# Patient Record
Sex: FEMALE | Race: WHITE | NOT HISPANIC OR LATINO | Employment: UNEMPLOYED | URBAN - METROPOLITAN AREA
[De-identification: names, ages, dates, MRNs, and addresses within clinical notes are randomized per-mention and may not be internally consistent; named-entity substitution may affect disease eponyms.]

---

## 2019-11-21 ENCOUNTER — HOSPITAL ENCOUNTER (EMERGENCY)
Facility: HOSPITAL | Age: 29
Discharge: HOME | End: 2019-11-21
Attending: EMERGENCY MEDICINE
Payer: COMMERCIAL

## 2019-11-21 VITALS
SYSTOLIC BLOOD PRESSURE: 115 MMHG | HEIGHT: 65 IN | DIASTOLIC BLOOD PRESSURE: 70 MMHG | TEMPERATURE: 98 F | HEART RATE: 80 BPM | RESPIRATION RATE: 18 BRPM | WEIGHT: 152 LBS | BODY MASS INDEX: 25.33 KG/M2 | OXYGEN SATURATION: 100 %

## 2019-11-21 DIAGNOSIS — M25.561 RIGHT KNEE PAIN, UNSPECIFIED CHRONICITY: Primary | ICD-10-CM

## 2019-11-21 DIAGNOSIS — M54.9 BACK PAIN, UNSPECIFIED BACK LOCATION, UNSPECIFIED BACK PAIN LATERALITY, UNSPECIFIED CHRONICITY: ICD-10-CM

## 2019-11-21 DIAGNOSIS — R52 PAIN: ICD-10-CM

## 2019-11-21 LAB
B-HCG UR QL: NEGATIVE
CTP QC/QA: YES

## 2019-11-21 PROCEDURE — 96372 THER/PROPH/DIAG INJ SC/IM: CPT

## 2019-11-21 PROCEDURE — 99284 EMERGENCY DEPT VISIT MOD MDM: CPT | Mod: 25

## 2019-11-21 PROCEDURE — 81025 URINE PREGNANCY TEST: CPT | Performed by: EMERGENCY MEDICINE

## 2019-11-21 PROCEDURE — 63600175 PHARM REV CODE 636 W HCPCS: Performed by: EMERGENCY MEDICINE

## 2019-11-21 RX ORDER — ACETAMINOPHEN 500 MG
1000 TABLET ORAL
Status: DISCONTINUED | OUTPATIENT
Start: 2019-11-21 | End: 2019-11-22 | Stop reason: HOSPADM

## 2019-11-21 RX ORDER — METHOCARBAMOL 500 MG/1
500 TABLET, FILM COATED ORAL 4 TIMES DAILY
Qty: 40 TABLET | Refills: 0 | Status: SHIPPED | OUTPATIENT
Start: 2019-11-21 | End: 2019-12-01

## 2019-11-21 RX ORDER — KETOROLAC TROMETHAMINE 30 MG/ML
15 INJECTION, SOLUTION INTRAMUSCULAR; INTRAVENOUS
Status: COMPLETED | OUTPATIENT
Start: 2019-11-21 | End: 2019-11-21

## 2019-11-21 RX ADMIN — KETOROLAC TROMETHAMINE 15 MG: 30 INJECTION, SOLUTION INTRAMUSCULAR at 11:11
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
## 2020-01-09 ENCOUNTER — HOSPITAL ENCOUNTER (EMERGENCY)
Facility: HOSPITAL | Age: 30
Discharge: HOME | End: 2020-01-09
Attending: EMERGENCY MEDICINE
Payer: MEDICAID

## 2020-01-09 VITALS
HEART RATE: 78 BPM | BODY MASS INDEX: 24.16 KG/M2 | DIASTOLIC BLOOD PRESSURE: 64 MMHG | TEMPERATURE: 98 F | SYSTOLIC BLOOD PRESSURE: 98 MMHG | HEIGHT: 65 IN | OXYGEN SATURATION: 100 % | WEIGHT: 145 LBS | RESPIRATION RATE: 16 BRPM

## 2020-01-09 DIAGNOSIS — S09.90XA CLOSED HEAD INJURY, INITIAL ENCOUNTER: ICD-10-CM

## 2020-01-09 DIAGNOSIS — N30.00 ACUTE CYSTITIS WITHOUT HEMATURIA: ICD-10-CM
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
## 2020-01-09 DIAGNOSIS — S61.209A AVULSION OF SKIN OF FINGER, INITIAL ENCOUNTER: ICD-10-CM

## 2020-01-09 LAB
B-HCG UR QL: NEGATIVE
BACTERIA #/AREA URNS HPF: ABNORMAL /HPF
BILIRUB UR QL STRIP: NEGATIVE
CLARITY UR: CLEAR
COLOR UR: ABNORMAL
CTP QC/QA: YES
GLUCOSE UR QL STRIP: NEGATIVE
HGB UR QL STRIP: NEGATIVE
KETONES UR QL STRIP: ABNORMAL
LEUKOCYTE ESTERASE UR QL STRIP: ABNORMAL
MICROSCOPIC COMMENT: ABNORMAL
NITRITE UR QL STRIP: NEGATIVE
PH UR STRIP: 7 [PH] (ref 5–8)
PROT UR QL STRIP: NEGATIVE
RBC #/AREA URNS HPF: 5 /HPF (ref 0–4)
SP GR UR STRIP: 1.02 (ref 1–1.03)
SQUAMOUS #/AREA URNS HPF: 27 /HPF
URN SPEC COLLECT METH UR: ABNORMAL
UROBILINOGEN UR STRIP-ACNC: ABNORMAL EU/DL
WBC #/AREA URNS HPF: 15 /HPF (ref 0–5)

## 2020-01-09 PROCEDURE — 99284 EMERGENCY DEPT VISIT MOD MDM: CPT | Mod: 25

## 2020-01-09 PROCEDURE — 87077 CULTURE AEROBIC IDENTIFY: CPT

## 2020-01-09 PROCEDURE — 25000003 PHARM REV CODE 250: Performed by: EMERGENCY MEDICINE

## 2020-01-09 PROCEDURE — 81025 URINE PREGNANCY TEST: CPT | Performed by: EMERGENCY MEDICINE

## 2020-01-09 PROCEDURE — 87186 SC STD MICRODIL/AGAR DIL: CPT

## 2020-01-09 PROCEDURE — 87088 URINE BACTERIA CULTURE: CPT

## 2020-01-09 PROCEDURE — 81000 URINALYSIS NONAUTO W/SCOPE: CPT

## 2020-01-09 PROCEDURE — 87086 URINE CULTURE/COLONY COUNT: CPT

## 2020-01-09 RX ORDER — KETOROLAC TROMETHAMINE 30 MG/ML
30 INJECTION, SOLUTION INTRAMUSCULAR; INTRAVENOUS
Status: DISCONTINUED | OUTPATIENT
Start: 2020-01-09 | End: 2020-01-09

## 2020-01-09 RX ORDER — CEPHALEXIN 500 MG/1
500 CAPSULE ORAL EVERY 8 HOURS
Qty: 21 CAPSULE | Refills: 0 | Status: SHIPPED | OUTPATIENT
Start: 2020-01-09 | End: 2020-01-16

## 2020-01-09 RX ORDER — ACETAMINOPHEN 325 MG/1
650 TABLET ORAL
Status: DISCONTINUED | OUTPATIENT
Start: 2020-01-09 | End: 2020-01-09 | Stop reason: HOSPADM

## 2020-01-09 RX ORDER — HYDROCODONE BITARTRATE AND ACETAMINOPHEN 7.5; 325 MG/1; MG/1
1 TABLET ORAL
Status: DISCONTINUED | OUTPATIENT
Start: 2020-01-09 | End: 2020-01-09 | Stop reason: HOSPADM

## 2020-01-09 RX ORDER — BUTALBITAL, ACETAMINOPHEN AND CAFFEINE 50; 325; 40 MG/1; MG/1; MG/1
1 TABLET ORAL EVERY 6 HOURS PRN
Qty: 14 TABLET | Refills: 0 | Status: SHIPPED | OUTPATIENT
Start: 2020-01-09 | End: 2020-02-08

## 2020-01-09 RX ORDER — BACITRACIN ZINC 500 UNIT/G
OINTMENT (GRAM) TOPICAL 2 TIMES DAILY
Qty: 28 G | Refills: 0 | Status: SHIPPED | OUTPATIENT
Start: 2020-01-09 | End: 2020-05-02 | Stop reason: ALTCHOICE

## 2020-01-09 RX ORDER — METOCLOPRAMIDE 10 MG/1
10 TABLET ORAL EVERY 6 HOURS PRN
Qty: 14 TABLET | Refills: 0 | Status: SHIPPED | OUTPATIENT
Start: 2020-01-09 | End: 2020-05-02 | Stop reason: ALTCHOICE

## 2020-01-09 RX ORDER — METOCLOPRAMIDE HYDROCHLORIDE 5 MG/ML
10 INJECTION INTRAMUSCULAR; INTRAVENOUS
Status: DISCONTINUED | OUTPATIENT
Start: 2020-01-09 | End: 2020-01-09 | Stop reason: HOSPADM

## 2020-01-09 RX ADMIN — BACITRACIN ZINC, POLYMYXIN B SULFATE, NEOMYCIN SULFATE 1 EACH: 400; 5000; 3.5 OINTMENT TOPICAL at 02:01
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
## 2020-01-09 NOTE — ED TRIAGE NOTES
Pt. Care assumed. Pt. Is awake, alert and oriented. Pt. Is tearful and requesting pain medication. Ordered medications reviewed with pt., but refuses at this time.

## 2020-01-09 NOTE — ED NOTES
Pt. Refuses Tylenol and Metoclopramide. Pt. Does not have a ride home and requests pain medication. Pt. Upset and cursing.

## 2020-01-11 LAB — BACTERIA UR CULT: ABNORMAL

## 2020-02-21 ENCOUNTER — HOSPITAL ENCOUNTER (EMERGENCY)
Facility: HOSPITAL | Age: 30
Discharge: HOME | End: 2020-02-21
Attending: EMERGENCY MEDICINE
Payer: MEDICAID

## 2020-02-21 VITALS
DIASTOLIC BLOOD PRESSURE: 74 MMHG | SYSTOLIC BLOOD PRESSURE: 122 MMHG | BODY MASS INDEX: 24.66 KG/M2 | TEMPERATURE: 98 F | WEIGHT: 148 LBS | RESPIRATION RATE: 18 BRPM | HEART RATE: 73 BPM | OXYGEN SATURATION: 99 % | HEIGHT: 65 IN

## 2020-02-21 DIAGNOSIS — N30.01 ACUTE CYSTITIS WITH HEMATURIA: Primary | ICD-10-CM

## 2020-02-21 DIAGNOSIS — M54.50 ACUTE BILATERAL LOW BACK PAIN WITHOUT SCIATICA: ICD-10-CM

## 2020-02-21 LAB
ALBUMIN SERPL BCP-MCNC: 4.3 G/DL (ref 3.5–5.2)
ALP SERPL-CCNC: 76 U/L (ref 38–126)
ALT SERPL W/O P-5'-P-CCNC: 37 U/L (ref 10–44)
ANION GAP SERPL CALC-SCNC: 7 MMOL/L (ref 8–16)
AST SERPL-CCNC: 34 U/L (ref 15–46)
B-HCG UR QL: NEGATIVE
BACTERIA #/AREA URNS AUTO: NORMAL /HPF
BASOPHILS # BLD AUTO: 0.05 K/UL (ref 0–0.2)
BASOPHILS NFR BLD: 0.7 % (ref 0–1.9)
BILIRUB SERPL-MCNC: 0.2 MG/DL (ref 0.1–1)
BILIRUB UR QL STRIP: NEGATIVE
BUN SERPL-MCNC: 15 MG/DL (ref 7–17)
CALCIUM SERPL-MCNC: 8.7 MG/DL (ref 8.7–10.5)
CHLORIDE SERPL-SCNC: 103 MMOL/L (ref 95–110)
CLARITY UR REFRACT.AUTO: CLEAR
CO2 SERPL-SCNC: 29 MMOL/L (ref 23–29)
COLOR UR AUTO: YELLOW
CREAT SERPL-MCNC: 0.76 MG/DL (ref 0.5–1.4)
DIFFERENTIAL METHOD: ABNORMAL
EOSINOPHIL # BLD AUTO: 0.2 K/UL (ref 0–0.5)
EOSINOPHIL NFR BLD: 3 % (ref 0–8)
ERYTHROCYTE [DISTWIDTH] IN BLOOD BY AUTOMATED COUNT: 12.9 % (ref 11.5–14.5)
EST. GFR  (AFRICAN AMERICAN): >60 ML/MIN/1.73 M^2
EST. GFR  (NON AFRICAN AMERICAN): >60 ML/MIN/1.73 M^2
GLUCOSE SERPL-MCNC: 69 MG/DL (ref 70–110)
GLUCOSE UR QL STRIP: NEGATIVE
HCT VFR BLD AUTO: 41 % (ref 37–48.5)
HGB BLD-MCNC: 13 G/DL (ref 12–16)
HGB UR QL STRIP: ABNORMAL
IMM GRANULOCYTES # BLD AUTO: 0.01 K/UL (ref 0–0.04)
IMM GRANULOCYTES NFR BLD AUTO: 0.1 % (ref 0–0.5)
KETONES UR QL STRIP: ABNORMAL
LEUKOCYTE ESTERASE UR QL STRIP: ABNORMAL
LIPASE SERPL-CCNC: 114 U/L (ref 23–300)
LYMPHOCYTES # BLD AUTO: 2.3 K/UL (ref 1–4.8)
LYMPHOCYTES NFR BLD: 30.9 % (ref 18–48)
MCH RBC QN AUTO: 32.3 PG (ref 27–31)
MCHC RBC AUTO-ENTMCNC: 31.7 G/DL (ref 32–36)
MCV RBC AUTO: 102 FL (ref 82–98)
MICROSCOPIC COMMENT: NORMAL
MONOCYTES # BLD AUTO: 0.7 K/UL (ref 0.3–1)
MONOCYTES NFR BLD: 9.1 % (ref 4–15)
NEUTROPHILS # BLD AUTO: 4.3 K/UL (ref 1.8–7.7)
NEUTROPHILS NFR BLD: 56.2 % (ref 38–73)
NITRITE UR QL STRIP: NEGATIVE
NRBC BLD-RTO: 0 /100 WBC
PH UR STRIP: 5 [PH] (ref 5–8)
PLATELET # BLD AUTO: 323 K/UL (ref 150–350)
PMV BLD AUTO: 9.4 FL (ref 9.2–12.9)
POTASSIUM SERPL-SCNC: 3.8 MMOL/L (ref 3.5–5.1)
PROT SERPL-MCNC: 7.3 G/DL (ref 6–8.4)
PROT UR QL STRIP: ABNORMAL
RBC # BLD AUTO: 4.02 M/UL (ref 4–5.4)
RBC #/AREA URNS AUTO: 1 /HPF (ref 0–4)
SODIUM SERPL-SCNC: 139 MMOL/L (ref 136–145)
SP GR UR STRIP: 1.02 (ref 1–1.03)
URN SPEC COLLECT METH UR: ABNORMAL
UROBILINOGEN UR STRIP-ACNC: NEGATIVE EU/DL
WBC # BLD AUTO: 7.58 K/UL (ref 3.9–12.7)
WBC #/AREA URNS AUTO: 2 /HPF (ref 0–5)

## 2020-02-21 PROCEDURE — 83690 ASSAY OF LIPASE: CPT | Mod: ER

## 2020-02-21 PROCEDURE — 99284 EMERGENCY DEPT VISIT MOD MDM: CPT | Mod: 25,ER

## 2020-02-21 PROCEDURE — 25000003 PHARM REV CODE 250: Mod: ER | Performed by: PHYSICIAN ASSISTANT

## 2020-02-21 PROCEDURE — 81000 URINALYSIS NONAUTO W/SCOPE: CPT | Mod: ER

## 2020-02-21 PROCEDURE — 96374 THER/PROPH/DIAG INJ IV PUSH: CPT | Mod: ER

## 2020-02-21 PROCEDURE — 96361 HYDRATE IV INFUSION ADD-ON: CPT | Mod: ER

## 2020-02-21 PROCEDURE — 63600175 PHARM REV CODE 636 W HCPCS: Mod: ER | Performed by: PHYSICIAN ASSISTANT

## 2020-02-21 PROCEDURE — 85025 COMPLETE CBC W/AUTO DIFF WBC: CPT | Mod: ER

## 2020-02-21 PROCEDURE — 80053 COMPREHEN METABOLIC PANEL: CPT | Mod: ER

## 2020-02-21 PROCEDURE — 81025 URINE PREGNANCY TEST: CPT | Mod: ER

## 2020-02-21 PROCEDURE — 96375 TX/PRO/DX INJ NEW DRUG ADDON: CPT | Mod: ER

## 2020-02-21 RX ORDER — SULFAMETHOXAZOLE AND TRIMETHOPRIM 800; 160 MG/1; MG/1
1 TABLET ORAL
Status: COMPLETED | OUTPATIENT
Start: 2020-02-21 | End: 2020-02-21

## 2020-02-21 RX ORDER — DEXTROMETHORPHAN HYDROBROMIDE, GUAIFENESIN 5; 100 MG/5ML; MG/5ML
650 LIQUID ORAL EVERY 8 HOURS
Qty: 12 TABLET | Refills: 0 | Status: SHIPPED | OUTPATIENT
Start: 2020-02-21 | End: 2020-06-22

## 2020-02-21 RX ORDER — METHOCARBAMOL 500 MG/1
500 TABLET, FILM COATED ORAL 3 TIMES DAILY
Qty: 15 TABLET | Refills: 0 | Status: SHIPPED | OUTPATIENT
Start: 2020-02-21 | End: 2020-02-26

## 2020-02-21 RX ORDER — GABAPENTIN 600 MG/1
600 TABLET ORAL 3 TIMES DAILY
COMMUNITY
End: 2020-04-29 | Stop reason: CLARIF

## 2020-02-21 RX ORDER — SULFAMETHOXAZOLE AND TRIMETHOPRIM 800; 160 MG/1; MG/1
1 TABLET ORAL 2 TIMES DAILY
Qty: 14 TABLET | Refills: 0 | Status: SHIPPED | OUTPATIENT
Start: 2020-02-21 | End: 2020-02-28

## 2020-02-21 RX ORDER — HALOPERIDOL 5 MG/ML
2.5 INJECTION INTRAMUSCULAR
Status: COMPLETED | OUTPATIENT
Start: 2020-02-21 | End: 2020-02-21

## 2020-02-21 RX ORDER — MORPHINE SULFATE 4 MG/ML
2 INJECTION, SOLUTION INTRAMUSCULAR; INTRAVENOUS
Status: COMPLETED | OUTPATIENT
Start: 2020-02-21 | End: 2020-02-21

## 2020-02-21 RX ORDER — TRAMADOL HYDROCHLORIDE 50 MG/1
50 TABLET ORAL
Status: COMPLETED | OUTPATIENT
Start: 2020-02-21 | End: 2020-02-21

## 2020-02-21 RX ORDER — CLONAZEPAM 1 MG/1
1 TABLET ORAL 2 TIMES DAILY PRN
COMMUNITY
End: 2020-04-29 | Stop reason: CLARIF

## 2020-02-21 RX ORDER — ONDANSETRON 2 MG/ML
4 INJECTION INTRAMUSCULAR; INTRAVENOUS
Status: COMPLETED | OUTPATIENT
Start: 2020-02-21 | End: 2020-02-21

## 2020-02-21 RX ADMIN — SODIUM CHLORIDE 1000 ML: 0.9 INJECTION, SOLUTION INTRAVENOUS at 02:02

## 2020-02-21 RX ADMIN — TRAMADOL HYDROCHLORIDE 50 MG: 50 TABLET, FILM COATED ORAL at 02:02

## 2020-02-21 RX ADMIN — ONDANSETRON 4 MG: 2 INJECTION INTRAMUSCULAR; INTRAVENOUS at 04:02

## 2020-02-21 RX ADMIN — MORPHINE SULFATE 2 MG: 4 INJECTION INTRAVENOUS at 03:02

## 2020-02-21 RX ADMIN — HALOPERIDOL LACTATE 2.5 MG: 5 INJECTION, SOLUTION INTRAMUSCULAR at 04:02

## 2020-02-21 RX ADMIN — SULFAMETHOXAZOLE AND TRIMETHOPRIM 1 TABLET: 800; 160 TABLET ORAL at 04:02

## 2020-02-21 NOTE — ED PROVIDER NOTES
"Encounter Date: 2/21/2020       History     Chief Complaint   Patient presents with    Back Pain     Pt reports pain to bilateral sides of lower back "for a while", states today is progressively worse.  Pt states has cloudy foul smelling urine.  Pt denies vaginal d/c, denies burning with urination. Pt states took gabapentin 4 hours ago for pain with no relief.      29-year-old female presents to the emergency department for evaluation of one-week history of dysuria and bilateral low back pain. She reports that the symptoms began gradually 1 week ago and have been constant since onset.  She has been attempting treatment at home with Tylenol with only mild relief of symptoms. She does report that she has had increased frequency of urination.  She states that the last time she felt like this she had a kidney infection.  She denies any abdominal pain, vomiting, diarrhea, constipation, vaginal bleeding, vaginal discharge or pelvic pain. She denies any other associated symptoms including fever, headache, dizziness, chest pain, shortness of breath or coughing.        Review of patient's allergies indicates:   Allergen Reactions    Naproxen Hives    Tessalon [benzonatate] Swelling     Past Medical History:   Diagnosis Date    Chronic bronchitis     Depression     Hepatitis C      Past Surgical History:   Procedure Laterality Date    HERNIA REPAIR      TUBAL LIGATION       History reviewed. No pertinent family history.  Social History     Tobacco Use    Smoking status: Former Smoker     Types: Cigarettes    Smokeless tobacco: Never Used    Tobacco comment: pt states stopped smoking 2 months ago   Substance Use Topics    Alcohol use: No    Drug use: No     Review of Systems   Constitutional: Negative for activity change, appetite change and fever.   HENT: Negative for congestion, rhinorrhea, sinus pressure, trouble swallowing and voice change.    Eyes: Negative for visual disturbance.   Respiratory: Negative " for cough and shortness of breath.    Cardiovascular: Negative for chest pain.   Gastrointestinal: Positive for nausea (Mild/intermittent). Negative for abdominal pain, blood in stool, diarrhea and vomiting.   Genitourinary: Positive for dysuria, flank pain and frequency. Negative for decreased urine volume, hematuria, menstrual problem, pelvic pain, vaginal bleeding, vaginal discharge and vaginal pain.   Musculoskeletal: Positive for back pain. Negative for arthralgias, joint swelling and neck pain.   Neurological: Negative for dizziness, syncope, weakness, light-headedness, numbness and headaches.       Physical Exam     Initial Vitals [02/21/20 1411]   BP Pulse Resp Temp SpO2   137/85 96 18 97.8 °F (36.6 °C) 100 %      MAP       --         Physical Exam    Nursing note and vitals reviewed.  Constitutional: She appears well-developed and well-nourished. She is not diaphoretic. No distress.   HENT:   Head: Normocephalic and atraumatic.   Right Ear: External ear normal.   Left Ear: External ear normal.   Nose: Nose normal.   Mouth/Throat: Oropharynx is clear and moist.   Eyes: Conjunctivae and EOM are normal. Pupils are equal, round, and reactive to light.   Neck: Normal range of motion. Neck supple.   Cardiovascular: Normal rate, regular rhythm and normal heart sounds. Exam reveals no gallop and no friction rub.    No murmur heard.  Pulmonary/Chest: Breath sounds normal. No respiratory distress. She has no wheezes. She has no rhonchi. She has no rales. She exhibits no tenderness.   Abdominal: Soft. Bowel sounds are normal. She exhibits no distension. There is no tenderness. There is CVA tenderness (Bilateral). There is no rebound, no tenderness at McBurney's point and negative Roth's sign.   Genitourinary:   Genitourinary Comments: Patient declined   Musculoskeletal:        Right hip: She exhibits normal range of motion, no tenderness and no bony tenderness.        Left hip: She exhibits normal range of motion,  normal strength and no tenderness.        Cervical back: She exhibits normal range of motion, no tenderness and no bony tenderness.        Thoracic back: She exhibits normal range of motion, no tenderness and no bony tenderness.        Lumbar back: She exhibits tenderness. She exhibits normal range of motion, no bony tenderness, no swelling and no edema.   Lymphadenopathy:     She has no cervical adenopathy.   Neurological: She is alert and oriented to person, place, and time. No cranial nerve deficit.   Skin: Skin is warm and dry.   Psychiatric: She has a normal mood and affect.         ED Course   Procedures  Labs Reviewed   URINALYSIS, REFLEX TO URINE CULTURE - Abnormal; Notable for the following components:       Result Value    Protein, UA Trace (*)     Ketones, UA 1+ (*)     Occult Blood UA 1+ (*)     Leukocytes, UA 1+ (*)     All other components within normal limits    Narrative:     Preferred Collection Type->Urine, Clean Catch   CBC W/ AUTO DIFFERENTIAL - Abnormal; Notable for the following components:    Mean Corpuscular Volume 102 (*)     Mean Corpuscular Hemoglobin 32.3 (*)     Mean Corpuscular Hemoglobin Conc 31.7 (*)     All other components within normal limits   COMPREHENSIVE METABOLIC PANEL - Abnormal; Notable for the following components:    Glucose 69 (*)     Anion Gap 7 (*)     All other components within normal limits   PREGNANCY TEST, URINE RAPID   URINALYSIS MICROSCOPIC    Narrative:     Preferred Collection Type->Urine, Clean Catch   LIPASE          Imaging Results          CT Renal Stone Study ABD Pelvis WO (Final result)  Result time 02/21/20 15:41:30    Final result by VIRGINIA Welch Sr., MD (02/21/20 15:41:30)                 Impression:      1. The kidneys, ureters, urinary bladder are normal in appearance.  2. There are mildly dilated loops of small bowel in the expected location of the jejunum. One of these on the right side of the abdomen as a diameter of 3.3 cm.  This is  characteristic of an enteritis.  If additional imaging evaluation is clinically indicated, I recommend consideration of a CT examination of the abdomen and pelvis with oral and IV contrast.  3. There is mild concentric bulging of the intervertebral disc between L4 and L5.  4. 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  5. There is a mild amount of levoconvex curvature of the lumbar spine.  All CT scans at this facility use dose modulation, iterative reconstruction, and/or weight base dosing when appropriate to reduce radiation dose when appropriate to reduce radiation dose to as low as reasonably achievable.      Electronically signed by: Willam Welch MD  Date:    02/21/2020  Time:    15:41             Narrative:    EXAMINATION:  CT RENAL STONE STUDY ABD PELVIS WO    CLINICAL HISTORY:  Flank pain, stone disease suspected;    TECHNIQUE:  Standard abdomen and pelvis CT protocol without oral or IV contrast was performed.    COMPARISON:  06/27/2016    FINDINGS:  Finding: The size of the heart is within normal limits.  There are minimal dependent atelectatic changes in both lungs.  There is no pneumothorax or pleural effusion.    The liver, gallbladder, pancreas, spleen, adrenals, and kidneys are normal in appearance. The ureters and the urinary bladder are normal in appearance.  The uterus and ovaries were not optimally visualized.  The appendix is normal in appearance.  There are mildly dilated loops of small bowel in the expected location of the jejunum.  One of these on the right side of the abdomen as a diameter of 3.3 cm.  There is no free fluid within the abdomen or pelvis. There is no pneumoperitoneum.  There is a mild amount of levoconvex curvature of the lumbar spine.  There is mild concentric bulging of the intervertebral disc between L4 and L5.  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                                 Medical Decision Making:   Initial Assessment:    29-year-old female presents for evaluation of bilateral back pain and dysuria.  Physical exam reveals a nontoxic-appearing female in no acute distress. Patient is afebrile vital signs within normal limits.  Neurological exam reveals an alert and oriented patient.  Lungs clear to auscultation bilaterally.  No respiratory distress or accessory muscle use noted. Abdominal exam reveals soft abdomen, nontender to palpation,.  Mild bilateral CVA tenderness noted. Tenderness to palpation noted over the paraspinal musculature of the lumbar spine.  No spinous process tenderness noted. No bony instability or step-offs noted. Full range of motion, sensation of peripheral pulses intact in lower extremities bilaterally.  Differential Diagnosis:   CT of the abdomen/pelvis ordered to assess possible renal calculi or evidence of pyelonephritis  UTI  Lumbar strain  Pregnancy  ED Management:  CBC reveals no acute leukocytosis or anemia.  CMP reveals glucose 69, all the results within normal limits.  Lipase 114.  UPT negative. Urinalysis reveals evidence of mild UTI.  CT report of the abdomen mildly dilated loops of small bowel at the jejunum.  One measuring 3.3 cm.  Characteristic of enteritis.  Other incidental findings noted. Patient is having normal bowel movements, not vomiting nor having abdominal pain. No further imaging indicated at this time.  Will cover the patient with antibiotics for possible pyelonephritis and muscle relaxers for lumbar pain. Instructed the patient to follow up with her primary care provider for re-evaluation and to return to the emergency department immediately for any new or worsening symptoms. I discussed this patient at length with Dr. Hannon who is in agreement course of treatment.                                 Clinical Impression:       ICD-10-CM ICD-9-CM   1. Acute cystitis with hematuria N30.01 595.0   2. Acute bilateral low back pain without sciatica M54.5 724.2     338.19                              Lidya Choi PA-C  02/22/20 0003

## 2020-04-29 ENCOUNTER — HOSPITAL ENCOUNTER (EMERGENCY)
Facility: HOSPITAL | Age: 30
Discharge: HOME | End: 2020-04-29
Attending: EMERGENCY MEDICINE
Payer: MEDICAID

## 2020-04-29 VITALS
HEART RATE: 99 BPM | SYSTOLIC BLOOD PRESSURE: 135 MMHG | DIASTOLIC BLOOD PRESSURE: 61 MMHG | RESPIRATION RATE: 17 BRPM | OXYGEN SATURATION: 97 % | TEMPERATURE: 99 F

## 2020-04-29 DIAGNOSIS — J06.9 UPPER RESPIRATORY TRACT INFECTION, UNSPECIFIED TYPE: Primary | ICD-10-CM

## 2020-04-29 DIAGNOSIS — Z76.5 DRUG-SEEKING BEHAVIOR: ICD-10-CM

## 2020-04-29 PROCEDURE — 99281 EMR DPT VST MAYX REQ PHY/QHP: CPT | Mod: ER

## 2020-04-29 NOTE — DISCHARGE INSTRUCTIONS
Here is a list of Internal Medicine/Family Medicine/Pediatric resources available in the community.    [facility]   Internal Medicine, Family Practice  [address], [place]   Telephone [phone]    [facility] Internal Medicine  Internal Medicine  [address], [address]  [place], [place]   Telephone [phone]    [facility]  Internal Medicine  [address], [address]  [place], [place]   Telephone [phone]  Or  [address] [facility], [place]  Telephone [phone]    [facility]  Family [facility]  [address], [address]  [place], [place]   Telephone [phone]    Ilya Wallace MD  [address], [address]  [place], [place]  Telephone [phone]    [facility]  Internal Medicine, Family Practice  [address] [facility], [place]  Telephone [phone]    [facility] Practice, Pediatrics  [address] [facility], [place]   Telephone [phone]    [facility] Practice, pediatrics, OBGYN, WIK, KidMed, Shots  [address] [facility]  [place], [place]   Telephone [phone]    [facility]  Internal Medicine, Family Practice, pediatrics, OBGYN,   dentistry, Behavioral Health, WIC, shots, specialty referral,   medication assistance, diabetic education  [address]  [place], [place]   Telephone [phone]    [facility]  Family Practice  [address], [address]  [place], [place]  Telephone [phone]

## 2020-04-29 NOTE — ED NOTES
pt reports she just wants codeine for her cough to stop the pain and she wants that only and wants to see a doctor that can prescribe her that.

## 2020-04-29 NOTE — ED PROVIDER NOTES
Encounter Date: 4/29/2020       History     Chief Complaint   Patient presents with    Cough     I have been coughing for two days and it hurts when i breathe      29-year-old female presents emergency department complaining of nasal congestion, postnasal drip, cough.  Cough is nonproductive.  Denies any shortness of breath.  Notes a diffuse aching chest pain elicited by cough that resolves after a few minutes.  No relief with over-the-counter Robitussin.  She has also been taking Claritin without relief.  No other symptoms reported at this time.        Review of patient's allergies indicates:   Allergen Reactions    Naproxen Hives    Tessalon [benzonatate] Swelling     Past Medical History:   Diagnosis Date    Chronic bronchitis     Depression     Hepatitis C      Past Surgical History:   Procedure Laterality Date    HERNIA REPAIR      TUBAL LIGATION       History reviewed. No pertinent family history.  Social History     Tobacco Use    Smoking status: Former Smoker     Types: Cigarettes    Smokeless tobacco: Never Used    Tobacco comment: pt states stopped smoking 2 months ago   Substance Use Topics    Alcohol use: No    Drug use: No     Review of Systems   Constitutional: Negative for chills and fever.   HENT: Positive for congestion. Negative for voice change.    Respiratory: Positive for cough. Negative for shortness of breath.    Cardiovascular: Positive for chest pain.   Gastrointestinal: Negative for abdominal pain, diarrhea and vomiting.   Neurological: Negative for light-headedness, numbness and headaches.       Physical Exam     Initial Vitals [04/29/20 1449]   BP Pulse Resp Temp SpO2   135/61 99 17 98.8 °F (37.1 °C) 97 %      MAP       --         Physical Exam    Nursing note and vitals reviewed.  Constitutional: She appears well-developed and well-nourished. No distress.   H&P performed via video conference from triage due to covid pandemic   HENT:   Head: Normocephalic and atraumatic.    Eyes: Conjunctivae and EOM are normal. Pupils are equal, round, and reactive to light.   Neck: Normal range of motion. Neck supple. No tracheal deviation present.   Cardiovascular: Normal rate.   Pulmonary/Chest: No respiratory distress.   Speaks full sentences, respirations even and nonlabored   Musculoskeletal: Normal range of motion. She exhibits no tenderness.   Neurological: She is alert and oriented to person, place, and time. She has normal strength. No cranial nerve deficit.         ED Course   Procedures  Labs Reviewed   PREGNANCY TEST, URINE RAPID   SARS-COV-2 RNA AMPLIFICATION, QUAL          Imaging Results    None          Medical Decision Making:   Initial Assessment:   29-year-old female presents emergency department complaining of cough, congestion, pleuritic type chest pain  Differential Diagnosis:   Pneumonia, pneumothorax, musculoskeletal, URI, sinusitis, corona virus  ED Management:  During HPI informed the patient that we do not prescribe codeine cough syrup routinely from the emergency room.  Patient became upset and declined any further testing, requested immediate discharge with a list of doctors who a give me what I need.  Attempted to explain our plan to give her medications that would help resolve her symptoms, patient declined further intervention or management.                                 Clinical Impression:       ICD-10-CM ICD-9-CM   1. Upper respiratory tract infection, unspecified type J06.9 465.9   2. Drug-seeking behavior Z76.5 305.90         Disposition:   Disposition: Discharged  Condition: Stable     ED Disposition Condition    Discharge Stable        ED Prescriptions     None        Follow-up Information     Follow up With Specialties Details Why Contact Info    Leroy Wilcox MD Family Medicine Schedule an appointment as soon as possible for a visit   [address] [place] [id number]  [phone]                                       Carlos Gonzalez,  MD  04/29/20 1505

## 2020-05-02 ENCOUNTER — HOSPITAL ENCOUNTER (EMERGENCY)
Facility: HOSPITAL | Age: 30
Discharge: HOME | End: 2020-05-02
Attending: EMERGENCY MEDICINE
Payer: MEDICAID

## 2020-05-02 VITALS
DIASTOLIC BLOOD PRESSURE: 72 MMHG | HEIGHT: 65 IN | WEIGHT: 135 LBS | HEART RATE: 83 BPM | OXYGEN SATURATION: 100 % | RESPIRATION RATE: 18 BRPM | BODY MASS INDEX: 22.49 KG/M2 | TEMPERATURE: 98 F | SYSTOLIC BLOOD PRESSURE: 112 MMHG

## 2020-05-02 DIAGNOSIS — R05.9 COUGH: ICD-10-CM

## 2020-05-02 DIAGNOSIS — N30.00 ACUTE CYSTITIS WITHOUT HEMATURIA: ICD-10-CM

## 2020-05-02 DIAGNOSIS — J06.9 VIRAL URI WITH COUGH: Primary | ICD-10-CM

## 2020-05-02 DIAGNOSIS — Z76.5 DRUG-SEEKING BEHAVIOR: ICD-10-CM

## 2020-05-02 DIAGNOSIS — R06.02 SOB (SHORTNESS OF BREATH): ICD-10-CM

## 2020-05-02 LAB
B-HCG UR QL: NEGATIVE
BACTERIA #/AREA URNS HPF: ABNORMAL /HPF
BILIRUB UR QL STRIP: NEGATIVE
CLARITY UR: ABNORMAL
COLOR UR: YELLOW
CTP QC/QA: YES
GLUCOSE UR QL STRIP: NEGATIVE
HGB UR QL STRIP: ABNORMAL
HYALINE CASTS #/AREA URNS LPF: 0 /LPF
KETONES UR QL STRIP: NEGATIVE
LEUKOCYTE ESTERASE UR QL STRIP: ABNORMAL
MICROSCOPIC COMMENT: ABNORMAL
NITRITE UR QL STRIP: NEGATIVE
PH UR STRIP: 6 [PH] (ref 5–8)
PROT UR QL STRIP: ABNORMAL
RBC #/AREA URNS HPF: 50 /HPF (ref 0–4)
SARS-COV-2 RDRP RESP QL NAA+PROBE: NEGATIVE
SP GR UR STRIP: 1.02 (ref 1–1.03)
SQUAMOUS #/AREA URNS HPF: 10 /HPF
URN SPEC COLLECT METH UR: ABNORMAL
UROBILINOGEN UR STRIP-ACNC: NEGATIVE EU/DL
WBC #/AREA URNS HPF: 50 /HPF (ref 0–5)
WBC CLUMPS URNS QL MICRO: ABNORMAL
YEAST URNS QL MICRO: ABNORMAL

## 2020-05-02 PROCEDURE — 81025 URINE PREGNANCY TEST: CPT | Performed by: NURSE PRACTITIONER

## 2020-05-02 PROCEDURE — U0002 COVID-19 LAB TEST NON-CDC: HCPCS

## 2020-05-02 PROCEDURE — 99284 EMERGENCY DEPT VISIT MOD MDM: CPT | Mod: 25

## 2020-05-02 PROCEDURE — 87086 URINE CULTURE/COLONY COUNT: CPT

## 2020-05-02 PROCEDURE — 87491 CHLMYD TRACH DNA AMP PROBE: CPT

## 2020-05-02 PROCEDURE — 87088 URINE BACTERIA CULTURE: CPT

## 2020-05-02 PROCEDURE — 81000 URINALYSIS NONAUTO W/SCOPE: CPT

## 2020-05-02 PROCEDURE — 87147 CULTURE TYPE IMMUNOLOGIC: CPT

## 2020-05-02 RX ORDER — CEPHALEXIN 500 MG/1
500 CAPSULE ORAL 4 TIMES DAILY
Qty: 20 CAPSULE | Refills: 0 | Status: SHIPPED | OUTPATIENT
Start: 2020-05-02 | End: 2020-05-07

## 2020-05-02 RX ORDER — GABAPENTIN 300 MG/1
300 CAPSULE ORAL 3 TIMES DAILY
COMMUNITY
End: 2020-06-22

## 2020-05-02 NOTE — ED PROVIDER NOTES
Encounter Date: 5/2/2020       History     Chief Complaint   Patient presents with    Cough     with congestion, denies fever, lower abdominal pain, back pain, all onset 1 week.      28 y/o female with menstrual cramps, cough, SOB, nasal congestion and body aches. She has taken robitussin-dm and Mucinex. All symptoms started a week ago. Denies sick contacts. Pt recently stopped smoking 3 weeks ago but was 1/2 pack smoker for 15 years.     Pt informed triage nurse that she as recently diagnosed and treated for an UTI with amoxicillin. She continues to have burning when she urinates.  Patient is currently on menstrual cycle.    The history is provided by the patient.     Review of patient's allergies indicates:   Allergen Reactions    Naproxen Hives    Tessalon [benzonatate] Swelling     Past Medical History:   Diagnosis Date    Chronic bronchitis     Depression     Hepatitis C      Past Surgical History:   Procedure Laterality Date    HERNIA REPAIR      TUBAL LIGATION       No family history on file.  Social History     Tobacco Use    Smoking status: Former Smoker     Types: Cigarettes    Smokeless tobacco: Never Used    Tobacco comment: pt states stopped smoking 2 months ago   Substance Use Topics    Alcohol use: No    Drug use: No     Review of Systems   Constitutional: Negative for chills and fever.   HENT: Positive for congestion, rhinorrhea, sinus pressure and sinus pain. Negative for sore throat.    Respiratory: Positive for cough and shortness of breath.    Cardiovascular: Negative for chest pain.   Gastrointestinal: Positive for abdominal pain (menstrual cramps). Negative for nausea.   Genitourinary: Positive for dysuria.   Musculoskeletal: Positive for back pain.   Skin: Negative for rash.   Neurological: Negative for weakness.   Hematological: Does not bruise/bleed easily.   All other systems reviewed and are negative.    Physical Exam     Initial Vitals [05/02/20 1109]   BP Pulse Resp Temp  SpO2   118/77 86 18 97.7 °F (36.5 °C) 100 %      MAP       --         Physical Exam    Nursing note and vitals reviewed.  Constitutional: She appears well-developed and well-nourished.   HENT:   Head: Normocephalic and atraumatic.   Eyes: Conjunctivae and EOM are normal. Pupils are equal, round, and reactive to light.   Cardiovascular:   Heart rate normal and regular per triage nurse   Pulmonary/Chest: No respiratory distress.   Lung sounds are clear per triage nurse   Abdominal:   No flank pain   Neurological: She is alert and oriented to person, place, and time. She has normal strength. GCS score is 15. GCS eye subscore is 4. GCS verbal subscore is 5. GCS motor subscore is 6.   Skin: Skin is warm.     The patient was seen virtually to reduce the risk of COVID exposure. The patient did not have any signs of respiratory distress on video chat. The patient was able to speak in complete sentences without difficulty breathing. The patient was well appearing.     ED Course   Procedures  Labs Reviewed   URINALYSIS, REFLEX TO URINE CULTURE - Abnormal; Notable for the following components:       Result Value    Appearance, UA Hazy (*)     Protein, UA Trace (*)     Occult Blood UA 3+ (*)     Leukocytes, UA 2+ (*)     All other components within normal limits    Narrative:     Preferred Collection Type->Urine, Clean Catch   URINALYSIS MICROSCOPIC - Abnormal; Notable for the following components:    RBC, UA 50 (*)     WBC, UA 50 (*)     Bacteria Few (*)     All other components within normal limits    Narrative:     Preferred Collection Type->Urine, Clean Catch   C. TRACHOMATIS/N. GONORRHOEAE BY AMP DNA   CULTURE, URINE   SARS-COV-2 RNA AMPLIFICATION, QUAL    Narrative:     What symptom criteria does the patient meet?->Cough  What symptom criteria does the patient meet?->Other (specify)  Please specify:->chest pain   POCT URINE PREGNANCY          Imaging Results          X-Ray Chest AP Portable (Final result)  Result time  05/02/20 12:04:07    Final result by Andrey Mendez MD (05/02/20 12:04:07)                 Impression:      1. No acute cardiopulmonary process.      Electronically signed by: Andrey Mendez MD  Date:    05/02/2020  Time:    12:04             Narrative:    EXAMINATION:  XR CHEST AP PORTABLE    CLINICAL HISTORY:  Cough    TECHNIQUE:  Single frontal view of the chest was performed.    COMPARISON:  09/05/2016    FINDINGS:  The cardiomediastinal silhouette is not enlarged.  There is no pleural effusion.  The trachea is midline.  The lungs are symmetrically expanded bilaterally without evidence of acute parenchymal process. No large focal consolidation seen.  There is no pneumothorax.  The osseous structures are remarkable for dextroscoliotic curvature of the spine..                                 Medical Decision Making:   History:   Old Medical Records: I decided to obtain old medical records.  Old Records Summarized: records from previous admission(s).       <> Summary of Records: Pt seen at [facility] on 4/29/20 with drug seeking behavior  Initial Assessment:   28 y/o female with menstrual cramps, cough, SOB, nasal congestion and body aches. She has taken robitussin-dm and Mucinex. All symptoms started a week ago. Denies sick contacts. Pt recently stopped smoking 3 weeks ago but was 1/2 pack smoker for 15 years.     Pt informed triage nurse that she as recently diagnosed and treated for an UTI with amoxicillin. She continues to have burning when she urinates.   Pt is requesting codeine cough medicine and states she keeps going to emergency rooms but no one will give it to her.   Differential Diagnosis:   Viral URI with cough, influenza, COVID-19, pneumonia, UTI, pyelonephritis, drug seeking behavior  Clinical Tests:   Lab Tests: Ordered and Reviewed  The following lab test(s) were unremarkable: UPT       <> Summary of Lab: Urinalysis consistent with UTI and patient currently on period  COVID  negative  Radiological Study: Ordered and Reviewed  ED Management:  Patient examined and has a normal exam.  Patient COVID test was negative.  Her urinalysis is concerning for continued UTI.  Patient was given cephalexin and also referred to hospitals family medicine.  She states that she gets a cough once a month and wants to be evaluated further. Patient given strict return precautions and voiced understanding of all discharge instructions. Pt was stable at discharge.                        ED Course as of May 02 1219   Sat May 02, 2020   1121 BP: 118/77 [AT]   1121 Temp: 97.7 °F (36.5 °C) [AT]   1121 Temp src: Oral [AT]   1121 Pulse: 86 [AT]   1121 Resp: 18 [AT]   1121 SpO2: 100 % [AT]   1153 Preg Test, Ur: Negative [AT]   1209 SARS-CoV-2 RNA, Amplification, Qual: Negative [AT]   1209 RBC, UA(!): 50 [AT]   1209 WBC, UA(!): 50 [AT]   1209 Bacteria, UA(!): Few [AT]   1209 Appearance, UA(!): Hazy [AT]      ED Course User Index  [AT] SIDDHARTH Hernandez                Clinical Impression:       ICD-10-CM ICD-9-CM   1. Viral URI with cough J06.9 465.9    B97.89    2. Cough R05 786.2   3. SOB (shortness of breath) R06.02 786.05   4. Acute cystitis without hematuria N30.00 595.0   5. Drug-seeking behavior Z76.5 305.90         Disposition:   Disposition: Discharged  Condition: Stable     ED Disposition Condition    Discharge Stable        ED Prescriptions     Medication Sig Dispense Start Date End Date Auth. Provider    cephALEXin (KEFLEX) 500 MG capsule Take 1 capsule (500 mg total) by mouth 4 (four) times daily. for 5 days 20 capsule 5/2/2020 5/7/2020 SIDDHARTH Hernandez        Follow-up Information     Follow up With Specialties Details Why Contact Info Additional Information    [facility] Schedule an appointment as soon as possible for a visit   [address], [address]  [facility] [address] [phone] At this time [facility] will only use these entries Main  [facility], [facility], and Emergency Department due to COVID-19 precautions.                                      Precious Vance, [facility]  05/02/20 1224

## 2020-05-02 NOTE — ED TRIAGE NOTES
"Pt presents to ED with complaints of a "wet, smokers cough", congestion, back pain, and lower abdominal pain that began about 1 weeks ago. Pt states she was diagnosed about a week ago with a UTI and was prescribed amoxicillin. Pt states that is still hurts she urinates. Pt states she also started her period 3 days ago and that her back and lower abdominal pain became worse. Pt denies fever, SOB, N/V/D. Pt states she is a current smoker. Pt states pain 8/10. Pt denies sick contacts.    APPEARANCE: Alert, oriented and in no acute distress.  CARDIAC: Normal rate and rhythm.  PERIPHERAL VASCULAR: peripheral pulses present. Normal cap refill. No edema. Warm to touch.    RESPIRATORY:Normal rate and effort, breath sounds clear bilaterally throughout chest. Respirations are equal and unlabored no obvious signs of distress.  GASTRO: soft, bowel sounds normal, no tenderness, no abdominal distention.  MUSC: Full ROM. No bony tenderness or soft tissue tenderness. No obvious deformity.  SKIN: Skin is warm and dry, normal skin turgor, mucous membranes moist.  NEURO: 5/5 strength major flexors/extensors bilaterally. Sensory intact to light touch bilaterally. [place] coma scale: eyes open spontaneously-4, oriented & converses-5, obeys commands-6. No neurological abnormalities.   MENTAL STATUS: awake, alert and aware of environment.      "

## 2020-05-02 NOTE — DISCHARGE INSTRUCTIONS
Please take all antibiotics as prescribed  You can take ibuprofen or tylenol for your menstrual cramps and back pain

## 2020-05-04 LAB — BACTERIA UR CULT: ABNORMAL

## 2020-05-05 LAB
C TRACH DNA SPEC QL NAA+PROBE: NOT DETECTED
N GONORRHOEA DNA SPEC QL NAA+PROBE: NOT DETECTED

## 2020-05-19 ENCOUNTER — HOSPITAL ENCOUNTER (EMERGENCY)
Facility: HOSPITAL | Age: 30
Discharge: HOME | End: 2020-05-19
Attending: EMERGENCY MEDICINE
Payer: MEDICAID

## 2020-05-19 VITALS
HEART RATE: 63 BPM | TEMPERATURE: 98 F | RESPIRATION RATE: 15 BRPM | SYSTOLIC BLOOD PRESSURE: 151 MMHG | WEIGHT: 145 LBS | OXYGEN SATURATION: 100 % | BODY MASS INDEX: 24.13 KG/M2 | DIASTOLIC BLOOD PRESSURE: 80 MMHG

## 2020-05-19 DIAGNOSIS — N73.0 PID (ACUTE PELVIC INFLAMMATORY DISEASE): ICD-10-CM

## 2020-05-19 DIAGNOSIS — R10.84 GENERALIZED ABDOMINAL PAIN: ICD-10-CM

## 2020-05-19 DIAGNOSIS — N89.8 VAGINAL DISCHARGE: Primary | ICD-10-CM

## 2020-05-19 LAB
ALBUMIN SERPL BCP-MCNC: 3.9 G/DL (ref 3.5–5.2)
ALP SERPL-CCNC: 79 U/L (ref 55–135)
ALT SERPL W/O P-5'-P-CCNC: 70 U/L (ref 10–44)
ANION GAP SERPL CALC-SCNC: 8 MMOL/L (ref 8–16)
AST SERPL-CCNC: 65 U/L (ref 10–40)
B-HCG UR QL: NEGATIVE
BACTERIA #/AREA URNS HPF: ABNORMAL /HPF
BACTERIA GENITAL QL WET PREP: ABNORMAL
BASOPHILS # BLD AUTO: 0.04 K/UL (ref 0–0.2)
BASOPHILS NFR BLD: 0.5 % (ref 0–1.9)
BILIRUB SERPL-MCNC: 0.7 MG/DL (ref 0.1–1)
BILIRUB UR QL STRIP: ABNORMAL
BUN SERPL-MCNC: 12 MG/DL (ref 6–20)
CALCIUM SERPL-MCNC: 9.3 MG/DL (ref 8.7–10.5)
CHLORIDE SERPL-SCNC: 101 MMOL/L (ref 95–110)
CLARITY UR: CLEAR
CLUE CELLS VAG QL WET PREP: ABNORMAL
CO2 SERPL-SCNC: 30 MMOL/L (ref 23–29)
COLOR UR: YELLOW
CREAT SERPL-MCNC: 0.8 MG/DL (ref 0.5–1.4)
CTP QC/QA: YES
DIFFERENTIAL METHOD: ABNORMAL
EOSINOPHIL # BLD AUTO: 0.2 K/UL (ref 0–0.5)
EOSINOPHIL NFR BLD: 2.6 % (ref 0–8)
ERYTHROCYTE [DISTWIDTH] IN BLOOD BY AUTOMATED COUNT: 13.1 % (ref 11.5–14.5)
EST. GFR  (AFRICAN AMERICAN): >60 ML/MIN/1.73 M^2
EST. GFR  (NON AFRICAN AMERICAN): >60 ML/MIN/1.73 M^2
FILAMENT FUNGI VAG WET PREP-#/AREA: ABNORMAL
GLUCOSE SERPL-MCNC: 87 MG/DL (ref 70–110)
GLUCOSE UR QL STRIP: NEGATIVE
HCT VFR BLD AUTO: 40.7 % (ref 37–48.5)
HGB BLD-MCNC: 13.2 G/DL (ref 12–16)
HGB UR QL STRIP: ABNORMAL
IMM GRANULOCYTES # BLD AUTO: 0.01 K/UL (ref 0–0.04)
IMM GRANULOCYTES NFR BLD AUTO: 0.1 % (ref 0–0.5)
KETONES UR QL STRIP: NEGATIVE
LEUKOCYTE ESTERASE UR QL STRIP: ABNORMAL
LIPASE SERPL-CCNC: 31 U/L (ref 4–60)
LYMPHOCYTES # BLD AUTO: 1.8 K/UL (ref 1–4.8)
LYMPHOCYTES NFR BLD: 23.2 % (ref 18–48)
MCH RBC QN AUTO: 30.8 PG (ref 27–31)
MCHC RBC AUTO-ENTMCNC: 32.4 G/DL (ref 32–36)
MCV RBC AUTO: 95 FL (ref 82–98)
MICROSCOPIC COMMENT: ABNORMAL
MONOCYTES # BLD AUTO: 0.7 K/UL (ref 0.3–1)
MONOCYTES NFR BLD: 9.2 % (ref 4–15)
NEUTROPHILS # BLD AUTO: 5 K/UL (ref 1.8–7.7)
NEUTROPHILS NFR BLD: 64.4 % (ref 38–73)
NITRITE UR QL STRIP: NEGATIVE
NRBC BLD-RTO: 0 /100 WBC
PH UR STRIP: 6 [PH] (ref 5–8)
PLATELET # BLD AUTO: 356 K/UL (ref 150–350)
PMV BLD AUTO: 10.2 FL (ref 9.2–12.9)
POCT GLUCOSE: 111 MG/DL (ref 70–110)
POCT GLUCOSE: 86 MG/DL (ref 70–110)
POTASSIUM SERPL-SCNC: 4.2 MMOL/L (ref 3.5–5.1)
PROT SERPL-MCNC: 7.9 G/DL (ref 6–8.4)
PROT UR QL STRIP: ABNORMAL
RBC # BLD AUTO: 4.29 M/UL (ref 4–5.4)
RBC #/AREA URNS HPF: 5 /HPF (ref 0–4)
SODIUM SERPL-SCNC: 139 MMOL/L (ref 136–145)
SP GR UR STRIP: 1.02 (ref 1–1.03)
SPECIMEN SOURCE: ABNORMAL
SQUAMOUS #/AREA URNS HPF: 3 /HPF
T VAGINALIS GENITAL QL WET PREP: ABNORMAL
URN SPEC COLLECT METH UR: ABNORMAL
UROBILINOGEN UR STRIP-ACNC: 1 EU/DL
WBC # BLD AUTO: 7.79 K/UL (ref 3.9–12.7)
WBC #/AREA URNS HPF: >100 /HPF (ref 0–5)
WBC #/AREA VAG WET PREP: ABNORMAL
YEAST GENITAL QL WET PREP: ABNORMAL

## 2020-05-19 PROCEDURE — 82962 GLUCOSE BLOOD TEST: CPT

## 2020-05-19 PROCEDURE — 87086 URINE CULTURE/COLONY COUNT: CPT

## 2020-05-19 PROCEDURE — 96360 HYDRATION IV INFUSION INIT: CPT

## 2020-05-19 PROCEDURE — 85025 COMPLETE CBC W/AUTO DIFF WBC: CPT

## 2020-05-19 PROCEDURE — 87186 SC STD MICRODIL/AGAR DIL: CPT

## 2020-05-19 PROCEDURE — 87491 CHLMYD TRACH DNA AMP PROBE: CPT

## 2020-05-19 PROCEDURE — 87077 CULTURE AEROBIC IDENTIFY: CPT

## 2020-05-19 PROCEDURE — 25500020 PHARM REV CODE 255: Performed by: EMERGENCY MEDICINE

## 2020-05-19 PROCEDURE — 87210 SMEAR WET MOUNT SALINE/INK: CPT

## 2020-05-19 PROCEDURE — 80053 COMPREHEN METABOLIC PANEL: CPT

## 2020-05-19 PROCEDURE — 83690 ASSAY OF LIPASE: CPT

## 2020-05-19 PROCEDURE — 87088 URINE BACTERIA CULTURE: CPT

## 2020-05-19 PROCEDURE — 99285 EMERGENCY DEPT VISIT HI MDM: CPT | Mod: 25

## 2020-05-19 PROCEDURE — 81025 URINE PREGNANCY TEST: CPT | Performed by: EMERGENCY MEDICINE

## 2020-05-19 PROCEDURE — 25000003 PHARM REV CODE 250: Performed by: EMERGENCY MEDICINE

## 2020-05-19 PROCEDURE — 81000 URINALYSIS NONAUTO W/SCOPE: CPT

## 2020-05-19 RX ORDER — DOXYCYCLINE 100 MG/1
100 CAPSULE ORAL 2 TIMES DAILY
Qty: 20 CAPSULE | Refills: 0 | Status: SHIPPED | OUTPATIENT
Start: 2020-05-19 | End: 2020-05-29

## 2020-05-19 RX ORDER — CEFTRIAXONE 250 MG/1
250 INJECTION, POWDER, FOR SOLUTION INTRAMUSCULAR; INTRAVENOUS
Status: DISCONTINUED | OUTPATIENT
Start: 2020-05-19 | End: 2020-05-20 | Stop reason: HOSPADM

## 2020-05-19 RX ORDER — METOCLOPRAMIDE HYDROCHLORIDE 5 MG/ML
10 INJECTION INTRAMUSCULAR; INTRAVENOUS
Status: DISCONTINUED | OUTPATIENT
Start: 2020-05-19 | End: 2020-05-20 | Stop reason: HOSPADM

## 2020-05-19 RX ORDER — HYDROCODONE BITARTRATE AND ACETAMINOPHEN 5; 325 MG/1; MG/1
1 TABLET ORAL
Status: DISCONTINUED | OUTPATIENT
Start: 2020-05-19 | End: 2020-05-19

## 2020-05-19 RX ORDER — LACTULOSE 10 G/15ML
20 SOLUTION ORAL 3 TIMES DAILY PRN
Qty: 900 ML | Refills: 0 | Status: SHIPPED | OUTPATIENT
Start: 2020-05-19 | End: 2020-05-29

## 2020-05-19 RX ADMIN — SODIUM CHLORIDE 1000 ML: 0.9 INJECTION, SOLUTION INTRAVENOUS at 10:05

## 2020-05-19 RX ADMIN — IOHEXOL 75 ML: 350 INJECTION, SOLUTION INTRAVENOUS at 10:05

## 2020-05-19 NOTE — PROVIDER PROGRESS NOTES - EMERGENCY DEPT.
Emergency Department TeleTRIAGE Encounter Note      CHIEF COMPLAINT    Chief Complaint   Patient presents with    Female  Problem     pt complains of recent eposure to STD , pt complains of vaginal pain, + vaginal discharge, and burning with urination, hx of cystitis       VITAL SIGNS   Initial Vitals [05/19/20 1830]   BP Pulse Resp Temp SpO2   137/89 87 16 98.2 °F (36.8 °C) 100 %      MAP       --            ALLERGIES    Review of patient's allergies indicates:   Allergen Reactions    Naproxen Hives    Tessalon [benzonatate] Swelling       PROVIDER TRIAGE NOTE  Patient with past medical history chronic bronchitis, depression, hepatitis-C presents to the ED for evaluation of dysuria and vaginal discharge.  Patient reports her boyfriend recently found out he has herpes.  She denies rash but does report vaginal discharge and irritation.  She also has burning with urination.  She denies back pain, fever, hematuria, abdominal pain, nausea, vomiting.      ORDERS  Labs Reviewed - No data to display    ED Orders (720h ago, onward)    Start Ordered     Status Ordering Provider    05/19/20 1849 05/19/20 1848  Urinalysis, Reflex to Urine Culture Urine, Clean Catch  STAT      Ordered TAMMY, MARI G.    05/19/20 1849 05/19/20 1848  POCT glucose  Once      Ordered TAMMY, MARI G.    05/19/20 1849 05/19/20 1848  C. trachomatis/N. gonorrhoeae by AMP DNA [facility]; Vagina  STAT  Collect    Ordered TAMMY, MARI G.    05/19/20 1849 05/19/20 1848  Vaginal Screen Vagina  STAT  Collect    Ordered TAMMY, MARI G.    05/19/20 1849 05/19/20 1848  Setup Patient for Pelvic Exam  Once      Ordered TAMMY, MARI G.            Virtual Visit Note: The provider triage portion of this emergency department evaluation and documentation was performed via [employer], a HIPAA-compliant telemedicine application, in concert with a tele-presenter in the room. A face to face patient evaluation with one of my colleagues will occur once the patient is  placed in an emergency department room.      DISCLAIMER: This note was prepared with [employer] voice recognition transcription software. Garbled syntax, mangled pronouns, and other bizarre constructions may be attributed to that software system.

## 2020-05-20 NOTE — ED NOTES
Pt refused medications after RN informed of 15 minute shot time following administration. MD at  speaking with pt regarding benefits of medication. Pt verbalized understanding and states refuses medication because she already called for a ride and boyfriend is outside. PT reports pain is 10/10, but states does not want medications in ED.

## 2020-05-20 NOTE — ED NOTES
Introduced self to pt and informed of RN assuming care at this time. Pt instructed to take pants and underwear off. Pelvic set up at BS.

## 2020-05-20 NOTE — ED NOTES
"Into room to update on plan of care and start fluids. Pt requesting pain medicine, states cannot get comfortable and "has been here for 5 hours". Pt notified will request MD to speak with pt regarding need for CT. MD notified.   "

## 2020-05-20 NOTE — ED NOTES
29y F to ED with c/o flank pain, recurrent yeast  Infections x3 months, burning with urination, and recently finding out her boyfriend has genital herpes.    APPEARANCE: Alert, oriented, no acute distress noted  CARDIAC:  HR and BP WNL. Denies chest pain  PERIPHERAL VASCULAR: peripheral pulses +2 and present x4 extremities. Cap refill <3 seconds.. No edema or discoloration. Warm to touch.  RESPIRATORY:Normal rate and effort. Respirations are equal and unlabored no obvious signs of distress.  GASTRO: soft, bowel sounds normal, no tenderness, no abdominal distention. C/o diffuse lower abdominal discomfort  G/U: +white vaginal discharge, +vaginal itching, +flank pain, +burning with urination  MUSC: Full ROM x4 extremities. No obvious deformity.  SKIN: Skin is warm and dry, normal skin turgor, mucous membranes moist.  NEURO:  GCS 15, motor strength WNL x4 extremities.   MENTAL STATUS: AAOx3, anxious cooperative, behavior appropriate to situation  EYE: PERRLA. Bilateral pupil equal, reaction brisk, normal size.   ENT: trachea midline, no problems identified

## 2020-05-20 NOTE — ED PROVIDER NOTES
Encounter Date: 5/19/2020       History     Chief Complaint   Patient presents with    Female  Problem     pt complains of recent eposure to STD , pt complains of vaginal pain, + vaginal discharge, and burning with urination, hx of cystitis     Cary King is a 29 y.o. female who  has a past medical history of Chronic bronchitis, Depression, and Hepatitis C.    She presents the emergency department with 1 week of vaginal discharge and 2 days of abdominal pain.  She reports crampy intermittent abdominal pain and burning with urination.  Denies fever cough or cold symptoms chest pain or shortness of breath.  She has had a recent closure to sexual transmitted infection.  She denies exacerbating or relieving factors.  She does note constipation for the past 2 days but is passing gas.        Review of patient's allergies indicates:   Allergen Reactions    Naproxen Hives    Tessalon [benzonatate] Swelling     Past Medical History:   Diagnosis Date    Chronic bronchitis     Depression     Hepatitis C      Past Surgical History:   Procedure Laterality Date    HERNIA REPAIR      TUBAL LIGATION       No family history on file.  Social History     Tobacco Use    Smoking status: Former Smoker     Types: Cigarettes    Smokeless tobacco: Never Used    Tobacco comment: pt states stopped smoking 2 months ago   Substance Use Topics    Alcohol use: No    Drug use: No     Review of Systems   Constitutional: Negative for chills and fever.   HENT: Negative for sore throat.    Respiratory: Negative for cough and shortness of breath.    Cardiovascular: Negative for chest pain.   Gastrointestinal: Positive for abdominal pain. Negative for nausea and vomiting.   Genitourinary: Positive for dysuria and vaginal discharge. Negative for frequency and urgency.   Musculoskeletal: Negative for back pain, neck pain and neck stiffness.   Skin: Negative for rash and wound.   Neurological: Negative for syncope and weakness.    Hematological: Does not bruise/bleed easily.   Psychiatric/Behavioral: Negative for agitation, behavioral problems and confusion.       Physical Exam     Initial Vitals [05/19/20 1830]   BP Pulse Resp Temp SpO2   137/89 87 16 98.2 °F (36.8 °C) 100 %      MAP       --         Physical Exam    Constitutional: No distress.   HENT:   Head: Normocephalic and atraumatic.   Eyes: Conjunctivae are normal.   Cardiovascular: Intact distal pulses.   Pulmonary/Chest: No respiratory distress.   Abdominal: She exhibits no distension. There is tenderness. There is no rebound.   Mild generalized tenderness without rebound or guarding   Neurological: She is alert.   Skin: Skin is warm and dry.   Psychiatric: She has a normal mood and affect.         ED Course   Procedures  Labs Reviewed   CULTURE, URINE - Abnormal; Notable for the following components:       Result Value    Urine Culture, Routine   (*)     Value: GRAM NEGATIVE HUMA  >100,000 cfu/ml  Identification and susceptibility pending      All other components within normal limits    Narrative:     Preferred Collection Type->Urine, Clean Catch   URINALYSIS, REFLEX TO URINE CULTURE - Abnormal; Notable for the following components:    Protein, UA Trace (*)     Bilirubin (UA) 1+ (*)     Occult Blood UA Trace (*)     Leukocytes, UA 1+ (*)     All other components within normal limits    Narrative:     Preferred Collection Type->Urine, Clean Catch   VAGINAL SCREEN - Abnormal; Notable for the following components:    WBC - Vaginal Screen Few (*)     Bacteria - Vaginal Screen Few (*)     All other components within normal limits   URINALYSIS MICROSCOPIC - Abnormal; Notable for the following components:    RBC, UA 5 (*)     WBC, UA >100 (*)     Bacteria Many (*)     All other components within normal limits    Narrative:     Preferred Collection Type->Urine, Clean Catch   CBC W/ AUTO DIFFERENTIAL - Abnormal; Notable for the following components:    Platelets 356 (*)     All other  components within normal limits   COMPREHENSIVE METABOLIC PANEL - Abnormal; Notable for the following components:    CO2 30 (*)     AST 65 (*)     ALT 70 (*)     All other components within normal limits   POCT GLUCOSE - Abnormal; Notable for the following components:    POCT Glucose 111 (*)     All other components within normal limits   C. TRACHOMATIS/N. GONORRHOEAE BY AMP DNA    Narrative:     Sources by Resulting Lab:->[facility]   LIPASE   POCT URINE PREGNANCY   POCT GLUCOSE          Imaging Results    None          Medical Decision Making:   Differential Diagnosis:   Differential Diagnosis includes, but is not limited to:  AAA, aortic dissection, mesenteric ischemia, perforated viscous, MI/ACS, SBO/volvulus, incarcerated/strangulated hernia, intussusception, ileus, appendicitis, cholecystitis, cholangitis, diverticulitis, esophagitis, hepatitis, nephrolithiasis, pancreatitis, gastroenteritis, colitis, IBD/IBS, biliary colic, GERD, PUD, constipation, UTI/pyelonephritis,  disorder.    ED Management:  Given presence of CMT will cover with doxycycline/ rocephin for suspected PID  CT abdomen negative for acute findings    Patient advised to follow up with Gyn.    Return precautions discussed for worsening symptoms, fever, vomiting, discharge or any other concerns.    After taking into careful account the historical factors and physical exam findings of the patient's presentation today, in conjunction with the empirical and objective data obtained on ED workup, no acute emergent medical condition has been identified. The patient appears to be low risk for an emergent medical condition and I feel it is safe and appropriate at this time for the patient to be discharged to follow-up as detailed in their discharge instructions for reevaluation and possible continued outpatient workup and management. I have discussed the specifics of the workup with the patient and the patient has verbalized understanding of the details  of the workup, the diagnosis, the treatment plan, and the need for outpatient follow-up.  Although the patient has no emergent etiology today this does not preclude the development of an emergent condition so in addition, I have advised the patient that they can return to the ED and/or activate EMS at any time with worsening of their symptoms, change of their symptoms, or with any other medical complaint.  The patient remained comfortable and stable during their visit in the ED.  Discharge and follow-up instructions discussed with the patient who expressed understanding and willingness to comply with my recommendations.                       ED Course as of May 21 1202   Tue May 19, 2020   2137 Pelvic exam moderate discharge CMT no adnexal tenderness    [RN]      ED Course User Index  [RN] Drew Razo Jr., MD                Clinical Impression:       ICD-10-CM ICD-9-CM   1. Vaginal discharge N89.8 623.5   2. Generalized abdominal pain R10.84 789.07   3. PID (acute pelvic inflammatory disease) N73.0 614.3     Portions of this note were dictated using voice recognition software and may contain dictation related errors in spelling/grammar/syntax not found on text review                               Drew Razo Jr., MD  05/21/20 8756

## 2020-05-21 LAB
C TRACH DNA SPEC QL NAA+PROBE: NOT DETECTED
N GONORRHOEA DNA SPEC QL NAA+PROBE: NOT DETECTED

## 2020-05-22 LAB — BACTERIA UR CULT: ABNORMAL

## 2020-06-03 ENCOUNTER — OFFICE VISIT (OUTPATIENT)
Dept: URGENT CARE | Facility: CLINIC | Age: 30
End: 2020-06-03
Payer: MEDICAID

## 2020-06-03 VITALS
TEMPERATURE: 99 F | BODY MASS INDEX: 24.16 KG/M2 | SYSTOLIC BLOOD PRESSURE: 124 MMHG | RESPIRATION RATE: 20 BRPM | HEART RATE: 103 BPM | OXYGEN SATURATION: 99 % | DIASTOLIC BLOOD PRESSURE: 62 MMHG | WEIGHT: 145 LBS | HEIGHT: 65 IN

## 2020-06-03 DIAGNOSIS — F41.9 ANXIETY: Primary | ICD-10-CM

## 2020-06-03 PROCEDURE — 99213 PR OFFICE/OUTPT VISIT, EST, LEVL III, 20-29 MIN: ICD-10-PCS | Mod: S$GLB,,, | Performed by: FAMILY MEDICINE

## 2020-06-03 PROCEDURE — 99213 OFFICE O/P EST LOW 20 MIN: CPT | Mod: S$GLB,,, | Performed by: FAMILY MEDICINE

## 2020-06-03 RX ORDER — CLONAZEPAM 0.5 MG/1
1 TABLET ORAL 2 TIMES DAILY
COMMUNITY
Start: 2020-06-02

## 2020-06-03 RX ORDER — BUPROPION HYDROCHLORIDE 100 MG/1
1 TABLET, FILM COATED ORAL 2 TIMES DAILY
COMMUNITY
Start: 2020-06-01

## 2020-06-03 RX ORDER — LACTULOSE 10 G/15ML
SOLUTION ORAL; RECTAL
Status: ON HOLD | COMMUNITY
Start: 2020-05-20 | End: 2020-06-09

## 2020-06-03 NOTE — PATIENT INSTRUCTIONS
Anxiety and Traumatic Brain Injury  A traumatic brain injury (TBI) is a brain injury that can change the way you think, act, and feel. A TBI could be caused by a blow to your head, falls, fights, sports, and car accidents.  Anxiety is fear and worry. Dealing with a TBI is stressful, so its not surprising that anxiety is a common symptom of a TBI. But when fear and worry become so strong that they get in the way of your ability to live your life, you could have an anxiety disorder.  Spotting an anxiety disorder with a TBI is important. This is because an anxiety disorder can make it hard to do the things you need to do to get better. An anxiety disorder may also increase your risk for substance abuse and depression.  Symptoms of anxiety disorder  Like a TBI, an anxiety disorder can change the way you think, act, and feel. It can also cause physical symptoms. In extreme cases, it can even cause a seizure. Here are some common symptoms to watch for:  · Extreme fear and worry that does not let up  · Shortness of breath  · Racing heartbeat  · Trouble sleeping  · Restlessness  · Trembling  · Dizziness  · Nausea  · Inability to think clearly  · Panic attacks  Types of anxiety disorders  If you have common symptoms of anxiety that get in the way of your ability to live your life, it is called generalized anxiety disorder.  There are also these specific kinds of anxiety disorders:  · Panic disorder causes fear that is more like terror. You may live in fear of having a panic attack. People with panic disorder sometimes become afraid to leave the house.  · Phobias are intense fears of certain things or situations. If you have this type of anxiety, you may fear an activity like flying or you may be afraid of public places.  · Obsessive compulsive disorder (OCD) causes you to have uncontrolled thoughts and feelings. People with OCD repeat behaviors, like cleaning or washing, over and over again.  · Post-traumatic stress  disorder (PTSD) is a type of anxiety in which people relive a traumatic event in flashbacks and nightmares. About 25 percent of people with a TBI have PTSD. This is common among veterans wounded during combat.   What to do for an anxiety disorder  Let your healthcare provider know about your anxiety symptoms. You are not alone. Your healthcare provider is aware of the risks of anxiety disorder and can help you. A mental health professional can treat an anxiety disorder with a type of counseling called cognitive behavioral therapy (CBT).  During CBT, you learn to figure out the sources of anxiety and manage your symptoms. CBT teaches you to change the thoughts that lead to anxiety. It also teaches you to deal with symptoms in healthy ways. Relaxation techniques and deep-breathing exercises may be part of the treatment. Antianxiety medicines are sometimes used along with CBT.  You can also take steps on your own to cope with anxiety:  · Share your fears and worries with others.  · Stay active and spend time with friends and loved ones.  · Do not use alcohol or drugs to relieve anxiety.  · Dont smoke or drink too much coffee.  · Eat a healthy diet, get regular exercise, and keep regular hours for sleep.  · Reduce stress by taking part in activities you enjoy.  TBI symptoms get better with time. Everybodys brain heals at a different pace. Be patient and give yourself the time you need. Dont let anxiety get in the way of your recovery. You dont need to suffer since treatment is available for anxiety and TBI.  Date Last Reviewed: 7/1/2016  © 8496-6313 [employer] [address] [place] [address], [place] [address]. All rights reserved. This information is not intended as a substitute for professional medical care. Always follow your healthcare professional's instructions.
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
## 2020-06-03 NOTE — PROGRESS NOTES
Pt states she suffer from anxiety and demanding for xanax and klonopin for one month supply. She states that she could not get in touch with her psychiatrist .  Denies suicidal or homicidal ideation. Pt stromed out [facility] when I told her that I can give her for few day until she get in touch with her psych

## 2020-06-09 ENCOUNTER — HOSPITAL ENCOUNTER (INPATIENT)
Facility: HOSPITAL | Age: 30
LOS: 7 days | Discharge: HOME | DRG: 744 | End: 2020-06-16
Attending: EMERGENCY MEDICINE | Admitting: SURGERY
Payer: MEDICAID

## 2020-06-09 DIAGNOSIS — R10.31 RLQ ABDOMINAL PAIN: Primary | ICD-10-CM

## 2020-06-09 DIAGNOSIS — R10.31 RIGHT LOWER QUADRANT ABDOMINAL PAIN: ICD-10-CM

## 2020-06-09 LAB
ALBUMIN SERPL BCP-MCNC: 3.9 G/DL (ref 3.5–5.2)
ALP SERPL-CCNC: 53 U/L (ref 38–126)
ALT SERPL W/O P-5'-P-CCNC: 47 U/L (ref 10–44)
ANION GAP SERPL CALC-SCNC: 4 MMOL/L (ref 8–16)
AST SERPL-CCNC: 40 U/L (ref 15–46)
B-HCG UR QL: NEGATIVE
BACTERIA GENITAL QL WET PREP: ABNORMAL
BASOPHILS # BLD AUTO: 0.04 K/UL (ref 0–0.2)
BASOPHILS NFR BLD: 0.8 % (ref 0–1.9)
BILIRUB SERPL-MCNC: 0.6 MG/DL (ref 0.1–1)
BILIRUB UR QL STRIP: NEGATIVE
BUN SERPL-MCNC: 9 MG/DL (ref 7–17)
CALCIUM SERPL-MCNC: 9.4 MG/DL (ref 8.7–10.5)
CHLORIDE SERPL-SCNC: 102 MMOL/L (ref 95–110)
CLARITY UR REFRACT.AUTO: CLEAR
CLUE CELLS VAG QL WET PREP: ABNORMAL
CO2 SERPL-SCNC: 31 MMOL/L (ref 23–29)
COLOR UR AUTO: YELLOW
CREAT SERPL-MCNC: 0.61 MG/DL (ref 0.5–1.4)
DIFFERENTIAL METHOD: ABNORMAL
EOSINOPHIL # BLD AUTO: 0.2 K/UL (ref 0–0.5)
EOSINOPHIL NFR BLD: 3.5 % (ref 0–8)
ERYTHROCYTE [DISTWIDTH] IN BLOOD BY AUTOMATED COUNT: 13.6 % (ref 11.5–14.5)
EST. GFR  (AFRICAN AMERICAN): >60 ML/MIN/1.73 M^2
EST. GFR  (NON AFRICAN AMERICAN): >60 ML/MIN/1.73 M^2
FILAMENT FUNGI VAG WET PREP-#/AREA: ABNORMAL
GLUCOSE SERPL-MCNC: 100 MG/DL (ref 70–110)
GLUCOSE UR QL STRIP: NEGATIVE
HCT VFR BLD AUTO: 36.5 % (ref 37–48.5)
HGB BLD-MCNC: 11.8 G/DL (ref 12–16)
HGB UR QL STRIP: NEGATIVE
IMM GRANULOCYTES # BLD AUTO: 0.01 K/UL (ref 0–0.04)
IMM GRANULOCYTES NFR BLD AUTO: 0.2 % (ref 0–0.5)
KETONES UR QL STRIP: NEGATIVE
LEUKOCYTE ESTERASE UR QL STRIP: NEGATIVE
LIPASE SERPL-CCNC: 98 U/L (ref 23–300)
LYMPHOCYTES # BLD AUTO: 1.9 K/UL (ref 1–4.8)
LYMPHOCYTES NFR BLD: 37.8 % (ref 18–48)
MCH RBC QN AUTO: 31.1 PG (ref 27–31)
MCHC RBC AUTO-ENTMCNC: 32.3 G/DL (ref 32–36)
MCV RBC AUTO: 96 FL (ref 82–98)
MONOCYTES # BLD AUTO: 0.5 K/UL (ref 0.3–1)
MONOCYTES NFR BLD: 8.8 % (ref 4–15)
NEUTROPHILS # BLD AUTO: 2.5 K/UL (ref 1.8–7.7)
NEUTROPHILS NFR BLD: 48.9 % (ref 38–73)
NITRITE UR QL STRIP: NEGATIVE
NRBC BLD-RTO: 0 /100 WBC
PH UR STRIP: 8 [PH] (ref 5–8)
PLATELET # BLD AUTO: 301 K/UL (ref 150–350)
PMV BLD AUTO: 9.9 FL (ref 9.2–12.9)
POTASSIUM SERPL-SCNC: 3.8 MMOL/L (ref 3.5–5.1)
PROT SERPL-MCNC: 6.9 G/DL (ref 6–8.4)
PROT UR QL STRIP: NEGATIVE
RBC # BLD AUTO: 3.8 M/UL (ref 4–5.4)
SARS-COV-2 RDRP RESP QL NAA+PROBE: NEGATIVE
SODIUM SERPL-SCNC: 137 MMOL/L (ref 136–145)
SP GR UR STRIP: 1.01 (ref 1–1.03)
SPECIMEN SOURCE: ABNORMAL
T VAGINALIS GENITAL QL WET PREP: ABNORMAL
URN SPEC COLLECT METH UR: NORMAL
UROBILINOGEN UR STRIP-ACNC: NEGATIVE EU/DL
WBC # BLD AUTO: 5.13 K/UL (ref 3.9–12.7)
WBC #/AREA VAG WET PREP: ABNORMAL
YEAST GENITAL QL WET PREP: ABNORMAL

## 2020-06-09 PROCEDURE — 81003 URINALYSIS AUTO W/O SCOPE: CPT | Mod: ER

## 2020-06-09 PROCEDURE — 96376 TX/PRO/DX INJ SAME DRUG ADON: CPT

## 2020-06-09 PROCEDURE — G0378 HOSPITAL OBSERVATION PER HR: HCPCS

## 2020-06-09 PROCEDURE — 96375 TX/PRO/DX INJ NEW DRUG ADDON: CPT | Mod: ER

## 2020-06-09 PROCEDURE — 96361 HYDRATE IV INFUSION ADD-ON: CPT

## 2020-06-09 PROCEDURE — 25000003 PHARM REV CODE 250: Performed by: SURGERY

## 2020-06-09 PROCEDURE — 96372 THER/PROPH/DIAG INJ SC/IM: CPT | Mod: 59,ER

## 2020-06-09 PROCEDURE — 80053 COMPREHEN METABOLIC PANEL: CPT | Mod: ER

## 2020-06-09 PROCEDURE — 96374 THER/PROPH/DIAG INJ IV PUSH: CPT | Mod: ER

## 2020-06-09 PROCEDURE — 83690 ASSAY OF LIPASE: CPT | Mod: ER

## 2020-06-09 PROCEDURE — 96361 HYDRATE IV INFUSION ADD-ON: CPT | Mod: ER

## 2020-06-09 PROCEDURE — 81025 URINE PREGNANCY TEST: CPT | Mod: ER

## 2020-06-09 PROCEDURE — U0002 COVID-19 LAB TEST NON-CDC: HCPCS | Mod: ER

## 2020-06-09 PROCEDURE — 25500020 PHARM REV CODE 255: Mod: ER | Performed by: EMERGENCY MEDICINE

## 2020-06-09 PROCEDURE — 87491 CHLMYD TRACH DNA AMP PROBE: CPT | Mod: ER

## 2020-06-09 PROCEDURE — 11000001 HC ACUTE MED/SURG PRIVATE ROOM

## 2020-06-09 PROCEDURE — 99285 EMERGENCY DEPT VISIT HI MDM: CPT | Mod: 25,ER

## 2020-06-09 PROCEDURE — 85025 COMPLETE CBC W/AUTO DIFF WBC: CPT | Mod: ER

## 2020-06-09 PROCEDURE — 87210 SMEAR WET MOUNT SALINE/INK: CPT | Mod: ER

## 2020-06-09 PROCEDURE — 63600175 PHARM REV CODE 636 W HCPCS: Mod: ER | Performed by: PHYSICIAN ASSISTANT

## 2020-06-09 PROCEDURE — 63600175 PHARM REV CODE 636 W HCPCS: Performed by: SURGERY

## 2020-06-09 PROCEDURE — 25000003 PHARM REV CODE 250: Mod: ER | Performed by: PHYSICIAN ASSISTANT

## 2020-06-09 RX ORDER — DICYCLOMINE HYDROCHLORIDE 10 MG/ML
10 INJECTION INTRAMUSCULAR
Status: COMPLETED | OUTPATIENT
Start: 2020-06-09 | End: 2020-06-09

## 2020-06-09 RX ORDER — ALPRAZOLAM 0.5 MG/1
0.5 TABLET ORAL 3 TIMES DAILY
Status: ON HOLD | COMMUNITY
End: 2020-06-09

## 2020-06-09 RX ORDER — TALC
6 POWDER (GRAM) TOPICAL NIGHTLY PRN
Status: DISCONTINUED | OUTPATIENT
Start: 2020-06-09 | End: 2020-06-16 | Stop reason: HOSPADM

## 2020-06-09 RX ORDER — ALPRAZOLAM 0.25 MG/1
0.5 TABLET ORAL 3 TIMES DAILY
Status: DISCONTINUED | OUTPATIENT
Start: 2020-06-09 | End: 2020-06-16 | Stop reason: HOSPADM

## 2020-06-09 RX ORDER — ONDANSETRON 8 MG/1
8 TABLET, ORALLY DISINTEGRATING ORAL EVERY 8 HOURS PRN
Status: DISCONTINUED | OUTPATIENT
Start: 2020-06-09 | End: 2020-06-16 | Stop reason: HOSPADM

## 2020-06-09 RX ORDER — ACETAMINOPHEN 500 MG
1000 TABLET ORAL EVERY 6 HOURS PRN
Status: DISCONTINUED | OUTPATIENT
Start: 2020-06-09 | End: 2020-06-10

## 2020-06-09 RX ORDER — BUTALBITAL, ACETAMINOPHEN AND CAFFEINE 50; 325; 40 MG/1; MG/1; MG/1
1 TABLET ORAL
Status: DISCONTINUED | OUTPATIENT
Start: 2020-06-09 | End: 2020-06-09

## 2020-06-09 RX ORDER — SODIUM CHLORIDE 0.9 % (FLUSH) 0.9 %
10 SYRINGE (ML) INJECTION
Status: DISCONTINUED | OUTPATIENT
Start: 2020-06-09 | End: 2020-06-16 | Stop reason: HOSPADM

## 2020-06-09 RX ORDER — MORPHINE SULFATE 4 MG/ML
2 INJECTION, SOLUTION INTRAMUSCULAR; INTRAVENOUS
Status: DISCONTINUED | OUTPATIENT
Start: 2020-06-09 | End: 2020-06-09

## 2020-06-09 RX ORDER — GABAPENTIN 300 MG/1
300 CAPSULE ORAL 3 TIMES DAILY
Status: DISCONTINUED | OUTPATIENT
Start: 2020-06-09 | End: 2020-06-14

## 2020-06-09 RX ORDER — KETOROLAC TROMETHAMINE 30 MG/ML
15 INJECTION, SOLUTION INTRAMUSCULAR; INTRAVENOUS EVERY 6 HOURS PRN
Status: DISCONTINUED | OUTPATIENT
Start: 2020-06-09 | End: 2020-06-10

## 2020-06-09 RX ORDER — MORPHINE SULFATE 4 MG/ML
2 INJECTION, SOLUTION INTRAMUSCULAR; INTRAVENOUS
Status: COMPLETED | OUTPATIENT
Start: 2020-06-09 | End: 2020-06-09

## 2020-06-09 RX ORDER — ONDANSETRON 2 MG/ML
4 INJECTION INTRAMUSCULAR; INTRAVENOUS
Status: COMPLETED | OUTPATIENT
Start: 2020-06-09 | End: 2020-06-09

## 2020-06-09 RX ORDER — METRONIDAZOLE 500 MG/1
500 TABLET ORAL
Status: COMPLETED | OUTPATIENT
Start: 2020-06-09 | End: 2020-06-09

## 2020-06-09 RX ORDER — BUTALBITAL, ACETAMINOPHEN AND CAFFEINE 50; 325; 40 MG/1; MG/1; MG/1
1 TABLET ORAL
Status: COMPLETED | OUTPATIENT
Start: 2020-06-09 | End: 2020-06-09

## 2020-06-09 RX ORDER — OXYCODONE HYDROCHLORIDE 5 MG/1
10 TABLET ORAL EVERY 4 HOURS PRN
Status: DISCONTINUED | OUTPATIENT
Start: 2020-06-09 | End: 2020-06-13

## 2020-06-09 RX ORDER — BUPROPION HYDROCHLORIDE 100 MG/1
100 TABLET, EXTENDED RELEASE ORAL 2 TIMES DAILY
Status: DISCONTINUED | OUTPATIENT
Start: 2020-06-09 | End: 2020-06-16 | Stop reason: HOSPADM

## 2020-06-09 RX ORDER — CLONAZEPAM 0.5 MG/1
0.5 TABLET ORAL 2 TIMES DAILY
Status: DISCONTINUED | OUTPATIENT
Start: 2020-06-09 | End: 2020-06-16 | Stop reason: HOSPADM

## 2020-06-09 RX ORDER — SODIUM CHLORIDE, SODIUM LACTATE, POTASSIUM CHLORIDE, CALCIUM CHLORIDE 600; 310; 30; 20 MG/100ML; MG/100ML; MG/100ML; MG/100ML
INJECTION, SOLUTION INTRAVENOUS CONTINUOUS
Status: DISCONTINUED | OUTPATIENT
Start: 2020-06-09 | End: 2020-06-14

## 2020-06-09 RX ADMIN — MORPHINE SULFATE 2 MG: 4 INJECTION INTRAVENOUS at 03:06

## 2020-06-09 RX ADMIN — Medication 6 MG: at 09:06

## 2020-06-09 RX ADMIN — ONDANSETRON 4 MG: 2 INJECTION INTRAMUSCULAR; INTRAVENOUS at 01:06

## 2020-06-09 RX ADMIN — MORPHINE SULFATE 2 MG: 4 INJECTION INTRAVENOUS at 05:06

## 2020-06-09 RX ADMIN — BUPROPION HYDROCHLORIDE 100 MG: 100 TABLET, FILM COATED, EXTENDED RELEASE ORAL at 09:06

## 2020-06-09 RX ADMIN — OXYCODONE HYDROCHLORIDE 10 MG: 5 TABLET ORAL at 09:06

## 2020-06-09 RX ADMIN — IOHEXOL 90 ML: 350 INJECTION, SOLUTION INTRAVENOUS at 03:06

## 2020-06-09 RX ADMIN — ALPRAZOLAM 0.5 MG: 0.25 TABLET ORAL at 09:06

## 2020-06-09 RX ADMIN — SODIUM CHLORIDE, SODIUM LACTATE, POTASSIUM CHLORIDE, AND CALCIUM CHLORIDE: .6; .31; .03; .02 INJECTION, SOLUTION INTRAVENOUS at 09:06

## 2020-06-09 RX ADMIN — CLONAZEPAM 0.5 MG: 0.5 TABLET ORAL at 09:06

## 2020-06-09 RX ADMIN — BUTALBITAL, ACETAMINOPHEN, AND CAFFEINE 1 TABLET: 50; 325; 40 TABLET ORAL at 01:06

## 2020-06-09 RX ADMIN — GABAPENTIN 300 MG: 300 CAPSULE ORAL at 09:06

## 2020-06-09 RX ADMIN — METRONIDAZOLE 500 MG: 500 TABLET ORAL at 05:06

## 2020-06-09 RX ADMIN — DICYCLOMINE HYDROCHLORIDE 10 MG: 20 INJECTION, SOLUTION INTRAMUSCULAR at 03:06

## 2020-06-09 RX ADMIN — SODIUM CHLORIDE 1000 ML: 0.9 INJECTION, SOLUTION INTRAVENOUS at 01:06

## 2020-06-09 NOTE — ED NOTES
Attempted to call report, instructed to call back in a few minutes. Pt crying and asking for pain meds, PA notified.

## 2020-06-09 NOTE — ED PROVIDER NOTES
Encounter Date: 6/9/2020       History     Chief Complaint   Patient presents with    Abdominal Pain     Pt c/o lower abd and right flank pain after starting her cycle 2 days ago. Pt c/o nausea and large BM today. Denies fever, cough, sore throat, chest pain or SOB. Pt states she was sent from urgent care. States she still has slight red bleeding. Pt took Midol at 0600.    Flank Pain     29-year-old female presents to the emergency department for evaluation of 4 day history of right-sided flank pain and right lower abdominal/pelvic pain.  She reports that the symptoms began gradually 4 days ago and have been constant since onset.  She reports that the pain is a sharp stabbing pain that has been  waxing and waning since onset.  She reports intermittent nausea, she denies any vomiting, diarrhea or constipation.  She reports that she had her last bowel movement this morning which was normal in consistency and color.  She reports that she has a history of kidney stones, and is unsure if this is similar to the pain that she has had previously.  She states that her menstrual cycle started on 06/06/2020, and has been causing pain.  She reports that she took Midol this morning approximately 6:00 a.m..  She states that she went to urgent care this morning, but they sent her here for evaluation of acute abdominal pain.  She denies any fever, chills, body aches, nasal congestion, sore throat, cough, chest pain or shortness of breath.        Review of patient's allergies indicates:   Allergen Reactions    Naproxen Hives    Tessalon [benzonatate] Swelling    Ciprofloxacin Rash     Past Medical History:   Diagnosis Date    Chronic bronchitis     Depression     Hepatitis C      Past Surgical History:   Procedure Laterality Date    HERNIA REPAIR      TUBAL LIGATION       History reviewed. No pertinent family history.  Social History     Tobacco Use    Smoking status: Current Every Day Smoker     Packs/day: 1.00      Types: Cigarettes    Smokeless tobacco: Never Used   Substance Use Topics    Alcohol use: No    Drug use: Not Currently     Types: Marijuana     Review of Systems   Constitutional: Negative for activity change, appetite change and fever.   HENT: Negative for congestion, nosebleeds, postnasal drip, sinus pain, sore throat, trouble swallowing and voice change.    Eyes: Negative for photophobia and visual disturbance.   Respiratory: Negative for chest tightness, shortness of breath and wheezing.    Cardiovascular: Negative for chest pain.   Gastrointestinal: Positive for abdominal pain and nausea. Negative for constipation, diarrhea and vomiting.   Genitourinary: Positive for flank pain, pelvic pain and vaginal bleeding. Negative for decreased urine volume, dysuria, frequency, hematuria, vaginal discharge and vaginal pain.   Musculoskeletal: Negative for back pain and neck pain.   Neurological: Negative for dizziness, syncope, light-headedness, numbness and headaches.       Physical Exam     Initial Vitals   BP Pulse Resp Temp SpO2   06/09/20 1213 06/09/20 1208 06/09/20 1208 06/09/20 1208 06/09/20 1208   (!) 141/104 78 (!) 22 98.6 °F (37 °C) 96 %      MAP       --                Physical Exam    Nursing note and vitals reviewed.  Constitutional: She appears well-developed and well-nourished. She is not diaphoretic. No distress.   HENT:   Head: Normocephalic and atraumatic.   Right Ear: External ear normal.   Left Ear: External ear normal.   Nose: Nose normal.   Mouth/Throat: Oropharynx is clear and moist.   Eyes: Conjunctivae and EOM are normal. Pupils are equal, round, and reactive to light.   Neck: Normal range of motion. Neck supple.   Cardiovascular: Normal rate, regular rhythm and normal heart sounds. Exam reveals no gallop and no friction rub.    Pulmonary/Chest: Breath sounds normal. No respiratory distress. She has no wheezes. She has no rhonchi. She has no rales. She exhibits no tenderness.   Abdominal:  Soft. There is tenderness in the right lower quadrant, periumbilical area and suprapubic area. There is CVA tenderness ( right-sided). There is no tenderness at McBurney's point and negative Roth's sign.   Genitourinary: Pelvic exam was performed with patient supine. There is no rash, tenderness or lesion on the right labia. There is no rash, tenderness or lesion on the left labia. Cervix exhibits no motion tenderness, no discharge and no friability. Right adnexum displays tenderness. Right adnexum displays no mass and no fullness. Left adnexum displays no mass, no tenderness and no fullness. No tenderness or bleeding in the vagina. No vaginal discharge found.   Lymphadenopathy:     She has no cervical adenopathy.   Neurological: She is alert and oriented to person, place, and time.   Skin: Skin is warm and dry.   Psychiatric: She has a normal mood and affect.         ED Course   Procedures  Labs Reviewed   CBC W/ AUTO DIFFERENTIAL   COMPREHENSIVE METABOLIC PANEL   LIPASE   PREGNANCY TEST, URINE RAPID   URINALYSIS, REFLEX TO URINE CULTURE          Imaging Results    None          Medical Decision Making:   Initial Assessment:   29-year-old female presents to the emergency department for evaluation of right-sided abdominal pain and right flank pain.  Physical exam reveals a nontoxic-appearing female in no acute distress.  Patient is afebrile, mildly hypertensive, but other vital signs within normal limits.  Neurological exam reveals an alert and oriented patient.  Lungs clear to auscultation bilaterally.  Abdominal exam reveals soft abdomen, tender to palpation of the right lower quadrant, suprapubic and periumbilical regions.  No peritoneal signs noted.  Right-sided CVA tenderness noted.   exam right-sided adnexal tenderness noted.  Differential Diagnosis:   Pregnancy  UTI  Gonorrhea  Chlamydia  Trichomonas  Bacterial vaginosis  Renal stone   Ultrasound of the pelvis ordered to assess possible serious etiology  including ovarian torsion and TOA   ED Management:  CBC reveals no acute leukocytosis and mild anemia.  CMP results within normal limits.  Lipase 98.  UPT negative.  Urinalysis reveals no evidence of urinary tract infection.  GC/chlamydia culture pending.  Patient adamantly declines prophylactic treatment with Rocephin and azithromycin at this time.  Ultrasound reveals no evidence of ovarian mass or torsion.  Uterus is unremarkable.  There is a density in the right lower quadrant that may represent the appendix measuring 3 mm in diameter.  No suspicious findings.  Difficult to ascertain certain and if acute appendicitis were suspected further workup was suggested.  CT of the abdomen/pelvis ordered to assess for possible acute appendicitis.  CT report reveals no acute abdominal or pelvic abnormality suggested.  Limited visualization of the appendiceal region without contrast but there is no definite suggestion of acute appendicitis.  Continued right lower quadrant abdominal pain despite medication.  Dr. Quinn discussed this with Dr. Cardenas who will admit this patient under his care for possible acute appendicitis.  Discussed these findings at length with the patient verbalizes understanding and agreement course of treatment.   evaluated this patient and is in agreement course of treatment.                                 Clinical Impression:       ICD-10-CM ICD-9-CM   1. RLQ abdominal pain R10.31 789.03                                Lidya Choi PA-C  06/09/20 1712

## 2020-06-10 ENCOUNTER — CLINICAL SUPPORT (OUTPATIENT)
Dept: SMOKING CESSATION | Facility: CLINIC | Age: 30
End: 2020-06-10

## 2020-06-10 DIAGNOSIS — F17.210 CIGARETTE SMOKER: Primary | ICD-10-CM

## 2020-06-10 PROBLEM — F32.A DEPRESSION: Status: ACTIVE | Noted: 2020-06-10

## 2020-06-10 LAB
ANION GAP SERPL CALC-SCNC: 6 MMOL/L (ref 8–16)
BASOPHILS # BLD AUTO: 0.02 K/UL (ref 0–0.2)
BASOPHILS NFR BLD: 0.4 % (ref 0–1.9)
BUN SERPL-MCNC: 6 MG/DL (ref 6–20)
C TRACH DNA SPEC QL NAA+PROBE: NOT DETECTED
CALCIUM SERPL-MCNC: 8.5 MG/DL (ref 8.7–10.5)
CHLORIDE SERPL-SCNC: 105 MMOL/L (ref 95–110)
CO2 SERPL-SCNC: 30 MMOL/L (ref 23–29)
CREAT SERPL-MCNC: 0.8 MG/DL (ref 0.5–1.4)
DIFFERENTIAL METHOD: ABNORMAL
EOSINOPHIL # BLD AUTO: 0.3 K/UL (ref 0–0.5)
EOSINOPHIL NFR BLD: 5.5 % (ref 0–8)
ERYTHROCYTE [DISTWIDTH] IN BLOOD BY AUTOMATED COUNT: 13.9 % (ref 11.5–14.5)
EST. GFR  (AFRICAN AMERICAN): >60 ML/MIN/1.73 M^2
EST. GFR  (NON AFRICAN AMERICAN): >60 ML/MIN/1.73 M^2
GLUCOSE SERPL-MCNC: 124 MG/DL (ref 70–110)
HCT VFR BLD AUTO: 33.7 % (ref 37–48.5)
HGB BLD-MCNC: 10.9 G/DL (ref 12–16)
IMM GRANULOCYTES # BLD AUTO: 0.01 K/UL (ref 0–0.04)
IMM GRANULOCYTES NFR BLD AUTO: 0.2 % (ref 0–0.5)
LYMPHOCYTES # BLD AUTO: 2.3 K/UL (ref 1–4.8)
LYMPHOCYTES NFR BLD: 43.9 % (ref 18–48)
MCH RBC QN AUTO: 31.3 PG (ref 27–31)
MCHC RBC AUTO-ENTMCNC: 32.3 G/DL (ref 32–36)
MCV RBC AUTO: 97 FL (ref 82–98)
MONOCYTES # BLD AUTO: 0.5 K/UL (ref 0.3–1)
MONOCYTES NFR BLD: 9 % (ref 4–15)
N GONORRHOEA DNA SPEC QL NAA+PROBE: NOT DETECTED
NEUTROPHILS # BLD AUTO: 2.1 K/UL (ref 1.8–7.7)
NEUTROPHILS NFR BLD: 41 % (ref 38–73)
NRBC BLD-RTO: 0 /100 WBC
PLATELET # BLD AUTO: 249 K/UL (ref 150–350)
PMV BLD AUTO: 10 FL (ref 9.2–12.9)
POTASSIUM SERPL-SCNC: 3.4 MMOL/L (ref 3.5–5.1)
RBC # BLD AUTO: 3.48 M/UL (ref 4–5.4)
SODIUM SERPL-SCNC: 141 MMOL/L (ref 136–145)
WBC # BLD AUTO: 5.12 K/UL (ref 3.9–12.7)

## 2020-06-10 PROCEDURE — 99999 PR PBB SHADOW E&M-EST. PATIENT-LVL I: CPT | Mod: PBBFAC,,,

## 2020-06-10 PROCEDURE — 96361 HYDRATE IV INFUSION ADD-ON: CPT

## 2020-06-10 PROCEDURE — 99223 PR INITIAL HOSPITAL CARE,LEVL III: ICD-10-PCS | Mod: ,,, | Performed by: SURGERY

## 2020-06-10 PROCEDURE — G0378 HOSPITAL OBSERVATION PER HR: HCPCS

## 2020-06-10 PROCEDURE — 11000001 HC ACUTE MED/SURG PRIVATE ROOM

## 2020-06-10 PROCEDURE — 36415 COLL VENOUS BLD VENIPUNCTURE: CPT

## 2020-06-10 PROCEDURE — 99407 PR TOBACCO USE CESSATION INTENSIVE >10 MINUTES: ICD-10-PCS | Mod: S$GLB,,,

## 2020-06-10 PROCEDURE — 25000003 PHARM REV CODE 250: Performed by: SURGERY

## 2020-06-10 PROCEDURE — 96375 TX/PRO/DX INJ NEW DRUG ADDON: CPT

## 2020-06-10 PROCEDURE — 96376 TX/PRO/DX INJ SAME DRUG ADON: CPT

## 2020-06-10 PROCEDURE — 99223 1ST HOSP IP/OBS HIGH 75: CPT | Mod: ,,, | Performed by: SURGERY

## 2020-06-10 PROCEDURE — 85025 COMPLETE CBC W/AUTO DIFF WBC: CPT

## 2020-06-10 PROCEDURE — 99999 PR PBB SHADOW E&M-EST. PATIENT-LVL I: ICD-10-PCS | Mod: PBBFAC,,,

## 2020-06-10 PROCEDURE — 94761 N-INVAS EAR/PLS OXIMETRY MLT: CPT

## 2020-06-10 PROCEDURE — 99407 BEHAV CHNG SMOKING > 10 MIN: CPT | Mod: S$GLB,,,

## 2020-06-10 PROCEDURE — 63600175 PHARM REV CODE 636 W HCPCS: Performed by: SURGERY

## 2020-06-10 PROCEDURE — 80048 BASIC METABOLIC PNL TOTAL CA: CPT

## 2020-06-10 RX ORDER — KETOROLAC TROMETHAMINE 30 MG/ML
15 INJECTION, SOLUTION INTRAMUSCULAR; INTRAVENOUS EVERY 8 HOURS
Status: DISCONTINUED | OUTPATIENT
Start: 2020-06-10 | End: 2020-06-12

## 2020-06-10 RX ORDER — MORPHINE SULFATE 2 MG/ML
2 INJECTION, SOLUTION INTRAMUSCULAR; INTRAVENOUS ONCE
Status: COMPLETED | OUTPATIENT
Start: 2020-06-10 | End: 2020-06-10

## 2020-06-10 RX ORDER — HYDROMORPHONE HYDROCHLORIDE 2 MG/ML
2 INJECTION, SOLUTION INTRAMUSCULAR; INTRAVENOUS; SUBCUTANEOUS EVERY 6 HOURS PRN
Status: DISCONTINUED | OUTPATIENT
Start: 2020-06-10 | End: 2020-06-12

## 2020-06-10 RX ORDER — KETOROLAC TROMETHAMINE 30 MG/ML
30 INJECTION, SOLUTION INTRAMUSCULAR; INTRAVENOUS ONCE
Status: COMPLETED | OUTPATIENT
Start: 2020-06-10 | End: 2020-06-10

## 2020-06-10 RX ORDER — ACETAMINOPHEN 500 MG
1000 TABLET ORAL EVERY 8 HOURS
Status: DISCONTINUED | OUTPATIENT
Start: 2020-06-10 | End: 2020-06-16 | Stop reason: HOSPADM

## 2020-06-10 RX ADMIN — ALPRAZOLAM 0.5 MG: 0.25 TABLET ORAL at 08:06

## 2020-06-10 RX ADMIN — OXYCODONE HYDROCHLORIDE 10 MG: 5 TABLET ORAL at 02:06

## 2020-06-10 RX ADMIN — SODIUM CHLORIDE, SODIUM LACTATE, POTASSIUM CHLORIDE, AND CALCIUM CHLORIDE: .6; .31; .03; .02 INJECTION, SOLUTION INTRAVENOUS at 06:06

## 2020-06-10 RX ADMIN — HYDROMORPHONE HYDROCHLORIDE 2 MG: 2 INJECTION, SOLUTION INTRAMUSCULAR; INTRAVENOUS; SUBCUTANEOUS at 04:06

## 2020-06-10 RX ADMIN — GABAPENTIN 300 MG: 300 CAPSULE ORAL at 03:06

## 2020-06-10 RX ADMIN — Medication 6 MG: at 09:06

## 2020-06-10 RX ADMIN — CLONAZEPAM 0.5 MG: 0.5 TABLET ORAL at 08:06

## 2020-06-10 RX ADMIN — OXYCODONE HYDROCHLORIDE 10 MG: 5 TABLET ORAL at 06:06

## 2020-06-10 RX ADMIN — GABAPENTIN 300 MG: 300 CAPSULE ORAL at 09:06

## 2020-06-10 RX ADMIN — ALPRAZOLAM 0.5 MG: 0.25 TABLET ORAL at 03:06

## 2020-06-10 RX ADMIN — KETOROLAC TROMETHAMINE 30 MG: 30 INJECTION, SOLUTION INTRAMUSCULAR at 12:06

## 2020-06-10 RX ADMIN — HYDROMORPHONE HYDROCHLORIDE 2 MG: 2 INJECTION, SOLUTION INTRAMUSCULAR; INTRAVENOUS; SUBCUTANEOUS at 10:06

## 2020-06-10 RX ADMIN — CLONAZEPAM 0.5 MG: 0.5 TABLET ORAL at 09:06

## 2020-06-10 RX ADMIN — KETOROLAC TROMETHAMINE 15 MG: 30 INJECTION, SOLUTION INTRAMUSCULAR at 09:06

## 2020-06-10 RX ADMIN — ONDANSETRON 8 MG: 8 TABLET, ORALLY DISINTEGRATING ORAL at 10:06

## 2020-06-10 RX ADMIN — GABAPENTIN 300 MG: 300 CAPSULE ORAL at 08:06

## 2020-06-10 RX ADMIN — BUPROPION HYDROCHLORIDE 100 MG: 100 TABLET, FILM COATED, EXTENDED RELEASE ORAL at 09:06

## 2020-06-10 RX ADMIN — ALPRAZOLAM 0.5 MG: 0.25 TABLET ORAL at 09:06

## 2020-06-10 RX ADMIN — SODIUM CHLORIDE, SODIUM LACTATE, POTASSIUM CHLORIDE, AND CALCIUM CHLORIDE: .6; .31; .03; .02 INJECTION, SOLUTION INTRAVENOUS at 05:06

## 2020-06-10 RX ADMIN — BUPROPION HYDROCHLORIDE 100 MG: 100 TABLET, FILM COATED, EXTENDED RELEASE ORAL at 08:06

## 2020-06-10 RX ADMIN — ACETAMINOPHEN 1000 MG: 500 TABLET ORAL at 09:06

## 2020-06-10 RX ADMIN — OXYCODONE HYDROCHLORIDE 10 MG: 5 TABLET ORAL at 10:06

## 2020-06-10 RX ADMIN — MORPHINE SULFATE 2 MG: 2 INJECTION, SOLUTION INTRAMUSCULAR; INTRAVENOUS at 01:06

## 2020-06-10 RX ADMIN — KETOROLAC TROMETHAMINE 15 MG: 30 INJECTION, SOLUTION INTRAMUSCULAR at 04:06

## 2020-06-10 NOTE — PLAN OF CARE
VN cued into room to complete admit assessment, VIP model introduced, VN working alongside bedside treatment team.  Plan of care reviewed with patient. Patient verbalized understanding. Patient informed of fall risk and fall precautions, call light within reach, 2x bed rails. Patient notified to ask staff for assistance and pt verbalized complete understanding. Time allowed for questions. Pt. Does have right flank pain 10/10 . Floor nurse aware .Will continue to monitor and intervene as needed.

## 2020-06-10 NOTE — PLAN OF CARE
Problem: Fall Injury Risk  Goal: Absence of Fall and Fall-Related Injury  Outcome: Ongoing, Progressing     Problem: Adult Inpatient Plan of Care  Goal: Plan of Care Review  6/10/2020 0623 by Paige Cuevas LPN  Outcome: Ongoing, Progressing  6/10/2020 0623 by Paige Cuevas LPN  Outcome: Ongoing, Progressing  6/10/2020 0622 by Paige Cuevas LPN  Outcome: Ongoing, Progressing  Goal: Patient-Specific Goal (Individualization)  Outcome: Ongoing, Progressing  Goal: Absence of Hospital-Acquired Illness or Injury  Outcome: Ongoing, Progressing  Goal: Optimal Comfort and Wellbeing  Outcome: Ongoing, Progressing  Goal: Readiness for Transition of Care  Outcome: Ongoing, Progressing  Goal: Rounds/Family Conference  Outcome: Ongoing, Progressing     Problem: Pain Acute  Goal: Optimal Pain Control  Outcome: Ongoing, Progressing     Problem: Infection  Goal: Infection Symptom Resolution  Outcome: Ongoing, Progressing

## 2020-06-10 NOTE — NURSING
1039 Pt c/o of pain 10/10 to right abdomen that radiates to back. Pt given oxy PO. Pt offered toradol IV as well but pt refused. States toradol doesn't work for her.   1153 Pt c/o of pain 10/10 to abdomen.  Pt requesting IV pain med. Dr. Cardenas made aware of above. Order for toradol 30mg IV received.   1302 Pt still c/o pain 10/10 after toradol. Dr. Cardenas made aware. Order for IV morphine received.   1422 Pt c/o pain 10/10 to abdomen. Oxy IR given per MAR.  1550 Pt crying in pain. States pain is getting worse. Pt repositioned and heating packs applied. Dr. Cardenas made aware. MD states will place orders.

## 2020-06-10 NOTE — PLAN OF CARE
Permission attained to enter room via virtual system.  Virtual rounds completed as documented.  Today's lab values, notes, and vital signs up to now have been reviewed.  Pt continues to have severe abd pain   she is aware of the current pain control plan   next pain medication due within the next hour  pt also is aware of probable surgery tomorrow

## 2020-06-10 NOTE — PROGRESS NOTES
Smoking cessation education provided. Pt denies nicotine withdrawal symptoms and states that she does not wish to use the nicotine patch during hospital admission. She states that she is ready to quit smoking.  Abulatory referral to Smoking Cessation program following hospital discharge.

## 2020-06-10 NOTE — H&P
[facility]  INPATIENT H&P    REASON FOR CONSULT/ADMISSION:  Right lower quadrant pain    HPI: Cary King is a 29 y.o. female who presented to [facility] Emergency room with gradually worsening right lower quadrant pain which became severe over 4 days.  Pain was located in the right lower quadrant but also radiated to the right flank and back.  Pain started approximately 2 days after the beginning of her menses.  In the emergency room she was found have grossly normal labs including a normal white count, normal LFTs and normal UA with a negative pregnancy test.  Pelvic ultrasound was obtained which failed to show any ovarian torsion or other abnormality.  A CT scan was then obtained which failed to clearly identify the appendix but no obvious right lower quadrant inflammatory process was present.  Surgery was consulted for evaluation.  Patient had received multiple doses narcotic pain medication without relief.  Due to severity of pain and uncertain etiology she was accepted as a transfer and put in observation.     Patient reports continued right lower quadrant pain which was minimally relieved with oxycodone.  The pain is pretty constant.  Denies nausea or vomiting or fever or chills.  Has had normal bowel function without any obvious abnormalities and without any relief in her pain.  Still passing flatus.  No issues with urination.    I have reviewed the patient's chart including prior progress notes, procedures and testing. The patient has a previous history of tubal ligation and umbilical hernia repair with mesh.  Was treated for UTI and possible STD in May.    ROS:   Review of Systems   Constitutional: Positive for activity change. Negative for appetite change, chills and fever.   Respiratory: Negative for apnea, chest tightness and shortness of breath.    Cardiovascular: Negative for chest pain, palpitations and leg swelling.   Gastrointestinal: Positive for abdominal pain. Negative for  abdominal distention, constipation, diarrhea, nausea and vomiting.   Genitourinary: Positive for vaginal bleeding. Negative for difficulty urinating, dysuria, hematuria and vaginal discharge.   Musculoskeletal: Negative for arthralgias, neck pain and neck stiffness.   Skin: Negative for color change, rash and wound.   Neurological: Negative for dizziness, syncope and light-headedness.   Psychiatric/Behavioral: Negative for agitation, behavioral problems and confusion.       PROBLEM LIST:  Patient Active Problem List   Diagnosis    Viral URI with cough    Cough    SOB (shortness of breath)    Acute cystitis without hematuria    RLQ abdominal pain    Right lower quadrant abdominal pain         HISTORY  Past Medical History:   Diagnosis Date    Chronic bronchitis     Depression     Hepatitis C        Past Surgical History:   Procedure Laterality Date    HERNIA REPAIR      TUBAL LIGATION         Social History     Tobacco Use    Smoking status: Current Every Day Smoker     Packs/day: 1.00     Types: Cigarettes    Smokeless tobacco: Never Used   Substance Use Topics    Alcohol use: No    Drug use: Not Currently     Types: Marijuana       History reviewed. No pertinent family history.      MEDS:  No current facility-administered medications on file prior to encounter.      Current Outpatient Medications on File Prior to Encounter   Medication Sig Dispense Refill    acetaminophen (TYLENOL) 650 MG TbSR Take 1 tablet (650 mg total) by mouth every 8 (eight) hours. 12 tablet 0    gabapentin (NEURONTIN) 300 MG capsule Take 300 mg by mouth 3 (three) times daily.      KLONOPIN 0.5 mg tablet Take 1 tablet by mouth 2 (two) times a day.      WELLBUTRIN  mg TBSR 12 hr tablet Take 1 tablet by mouth 2 (two) times a day.      albuterol 90 mcg/actuation inhaler Inhale 1-2 puffs into the lungs every 6 (six) hours as needed for Wheezing. 1 Inhaler 0       ALLERGIES:  Review of patient's allergies indicates:    Allergen Reactions    Naproxen Hives    Tessalon [benzonatate] Swelling    Ciprofloxacin Rash         VITALS:  Temp:  [97.7 °F (36.5 °C)-98.6 °F (37 °C)] 98.2 °F (36.8 °C)  Pulse:  [51-78] 63  Resp:  [14-22] 17  SpO2:  [96 %-100 %] 97 %  BP: ()/() 92/52    I/O last 3 completed shifts:  In: 275 [P.O.:275]  Out: 1200 [Urine:1200]      PHYSICAL EXAM:  Physical Exam   Constitutional: She is oriented to person, place, and time. She appears well-developed and well-nourished. No distress.   HENT:   Head: Normocephalic and atraumatic.   Nose: Nose normal.   Eyes: Conjunctivae and EOM are normal. No scleral icterus.   Neck: Normal range of motion. Neck supple. No tracheal tenderness present. No tracheal deviation present.   Cardiovascular: Normal rate, regular rhythm and intact distal pulses.   Pulmonary/Chest: Effort normal and breath sounds normal. No accessory muscle usage. No respiratory distress.   Abdominal: Soft. Normal appearance. She exhibits no distension, no ascites and no mass. There is tenderness (RLQ and right flank). There is no rebound. No hernia.   Musculoskeletal: Normal range of motion. She exhibits no edema or deformity.   Neurological: She is alert and oriented to person, place, and time. She exhibits normal muscle tone.   Skin: Skin is warm and dry. No rash noted. She is not diaphoretic. No erythema.   Psychiatric: She has a normal mood and affect. Her behavior is normal. Judgment and thought content normal.   Vitals reviewed.    PER ER EXAM:  Pelvic exam was performed with patient supine. There is no rash, tenderness or lesion on the right labia. There is no rash, tenderness or lesion on the left labia. Cervix exhibits no motion tenderness, no discharge and no friability. Right adnexum displays tenderness. Right adnexum displays no mass and no fullness. Left adnexum displays no mass, no tenderness and no fullness. No tenderness or bleeding in the vagina. No vaginal discharge  found    LABS:  Lab Results   Component Value Date    WBC 5.12 06/10/2020    RBC 3.48 (L) 06/10/2020    HGB 10.9 (L) 06/10/2020    HCT 33.7 (L) 06/10/2020     06/10/2020     Lab Results   Component Value Date     (H) 06/10/2020     06/10/2020    K 3.4 (L) 06/10/2020     06/10/2020    CO2 30 (H) 06/10/2020    BUN 6 06/10/2020    CREATININE 0.8 06/10/2020    CALCIUM 8.5 (L) 06/10/2020     Lab Results   Component Value Date    ALT 47 (H) 06/09/2020    AST 40 06/09/2020    ALKPHOS 53 06/09/2020    BILITOT 0.6 06/09/2020     No results found for: MG, PHOS    STUDIES:  Pelvic US and CT images and reports were personally reviewed.      Pelvic US  FINDINGS:  Uterus measures 9.6 x 4.6 x 5.3 cm in dimension.  Endometrial stripe of 7 mm.  Right ovary measures up to 2.8 cm in diameter and left ovary 2.4 cm.  Doppler analysis shows flow bilaterally without evidence of torsion.    There is a density in the right lower quadrant that may represent the appendix measuring 3 mm in diameter.  No suspicious findings.  Difficult to be absolutely certain however and if acute appendicitis were suspected, further workup would be suggested.      Impression       1.  As above.  No evidence of ovarian mass or torsion.  Uterus is unremarkable.       CT  Impression       1.  No acute abdominal or pelvic abnormality suggested.  Limited visualization of the appendiceal region without contrast but there is no definite evidence to suggest acute appendicitis.    2.  Otherwise as above.  No bowel obstruction or free air.         ASSESSMENT & PLAN:  29 y.o. female with right lower quadrant abdominal pain, depression  - etiology of pain uncertain  - admit to observation  - no antibiotics, hopefully will allow appendicitis to present itself if it is the etiology  - continue daily labs  - allow for clear liquid diet today, NPO midnight, LR at 75 cc/hours  - p.r.n. Tylenol, Toradol and oxycodone  - if pain persist we may still  consider diagnostic laparoscopy tomorrow  - need also consider possible other etiologies related to previous STDs,    - restart home psych meds  - ambulate, SCDs

## 2020-06-11 ENCOUNTER — ANESTHESIA (OUTPATIENT)
Dept: SURGERY | Facility: HOSPITAL | Age: 30
DRG: 744 | End: 2020-06-11
Payer: MEDICAID

## 2020-06-11 ENCOUNTER — ANESTHESIA EVENT (OUTPATIENT)
Dept: SURGERY | Facility: HOSPITAL | Age: 30
DRG: 744 | End: 2020-06-11
Payer: MEDICAID

## 2020-06-11 LAB
ANION GAP SERPL CALC-SCNC: 5 MMOL/L (ref 8–16)
BASOPHILS # BLD AUTO: 0.02 K/UL (ref 0–0.2)
BASOPHILS NFR BLD: 0.5 % (ref 0–1.9)
BUN SERPL-MCNC: 7 MG/DL (ref 6–20)
CALCIUM SERPL-MCNC: 8.3 MG/DL (ref 8.7–10.5)
CHLORIDE SERPL-SCNC: 106 MMOL/L (ref 95–110)
CO2 SERPL-SCNC: 28 MMOL/L (ref 23–29)
CREAT SERPL-MCNC: 0.7 MG/DL (ref 0.5–1.4)
DIFFERENTIAL METHOD: ABNORMAL
EOSINOPHIL # BLD AUTO: 0.2 K/UL (ref 0–0.5)
EOSINOPHIL NFR BLD: 5.4 % (ref 0–8)
ERYTHROCYTE [DISTWIDTH] IN BLOOD BY AUTOMATED COUNT: 13.9 % (ref 11.5–14.5)
EST. GFR  (AFRICAN AMERICAN): >60 ML/MIN/1.73 M^2
EST. GFR  (NON AFRICAN AMERICAN): >60 ML/MIN/1.73 M^2
GLUCOSE SERPL-MCNC: 88 MG/DL (ref 70–110)
HCT VFR BLD AUTO: 35.7 % (ref 37–48.5)
HGB BLD-MCNC: 11.4 G/DL (ref 12–16)
IMM GRANULOCYTES # BLD AUTO: 0 K/UL (ref 0–0.04)
IMM GRANULOCYTES NFR BLD AUTO: 0 % (ref 0–0.5)
LYMPHOCYTES # BLD AUTO: 1.6 K/UL (ref 1–4.8)
LYMPHOCYTES NFR BLD: 38.6 % (ref 18–48)
MCH RBC QN AUTO: 31.1 PG (ref 27–31)
MCHC RBC AUTO-ENTMCNC: 31.9 G/DL (ref 32–36)
MCV RBC AUTO: 98 FL (ref 82–98)
MONOCYTES # BLD AUTO: 0.5 K/UL (ref 0.3–1)
MONOCYTES NFR BLD: 11.2 % (ref 4–15)
NEUTROPHILS # BLD AUTO: 1.8 K/UL (ref 1.8–7.7)
NEUTROPHILS NFR BLD: 44.3 % (ref 38–73)
NRBC BLD-RTO: 0 /100 WBC
PLATELET # BLD AUTO: 148 K/UL (ref 150–350)
PLATELET BLD QL SMEAR: ABNORMAL
PMV BLD AUTO: 11 FL (ref 9.2–12.9)
POTASSIUM SERPL-SCNC: 3.8 MMOL/L (ref 3.5–5.1)
RBC # BLD AUTO: 3.66 M/UL (ref 4–5.4)
SODIUM SERPL-SCNC: 139 MMOL/L (ref 136–145)
WBC # BLD AUTO: 4.09 K/UL (ref 3.9–12.7)

## 2020-06-11 PROCEDURE — 44180 PR LAP, SURG ENTEROLYSIS: ICD-10-PCS | Mod: ,,, | Performed by: SURGERY

## 2020-06-11 PROCEDURE — 36000708 HC OR TIME LEV III 1ST 15 MIN: Performed by: SURGERY

## 2020-06-11 PROCEDURE — 88300 SURGICAL PATH GROSS: CPT | Performed by: PATHOLOGY

## 2020-06-11 PROCEDURE — 44180 LAP ENTEROLYSIS: CPT | Mod: ,,, | Performed by: SURGERY

## 2020-06-11 PROCEDURE — 37000009 HC ANESTHESIA EA ADD 15 MINS: Performed by: SURGERY

## 2020-06-11 PROCEDURE — 25000003 PHARM REV CODE 250: Performed by: SURGERY

## 2020-06-11 PROCEDURE — 96376 TX/PRO/DX INJ SAME DRUG ADON: CPT

## 2020-06-11 PROCEDURE — 71000039 HC RECOVERY, EACH ADD'L HOUR: Performed by: SURGERY

## 2020-06-11 PROCEDURE — 80048 BASIC METABOLIC PNL TOTAL CA: CPT

## 2020-06-11 PROCEDURE — 85025 COMPLETE CBC W/AUTO DIFF WBC: CPT

## 2020-06-11 PROCEDURE — 71000033 HC RECOVERY, INTIAL HOUR: Performed by: SURGERY

## 2020-06-11 PROCEDURE — 25000003 PHARM REV CODE 250: Performed by: NURSE ANESTHETIST, CERTIFIED REGISTERED

## 2020-06-11 PROCEDURE — 63600175 PHARM REV CODE 636 W HCPCS: Performed by: SURGERY

## 2020-06-11 PROCEDURE — 88300 SURGICAL PATH GROSS: CPT | Mod: 26,,, | Performed by: PATHOLOGY

## 2020-06-11 PROCEDURE — 37000008 HC ANESTHESIA 1ST 15 MINUTES: Performed by: SURGERY

## 2020-06-11 PROCEDURE — 36415 COLL VENOUS BLD VENIPUNCTURE: CPT

## 2020-06-11 PROCEDURE — 63600175 PHARM REV CODE 636 W HCPCS: Performed by: NURSE ANESTHETIST, CERTIFIED REGISTERED

## 2020-06-11 PROCEDURE — 96361 HYDRATE IV INFUSION ADD-ON: CPT

## 2020-06-11 PROCEDURE — 36000709 HC OR TIME LEV III EA ADD 15 MIN: Performed by: SURGERY

## 2020-06-11 PROCEDURE — 11000001 HC ACUTE MED/SURG PRIVATE ROOM

## 2020-06-11 PROCEDURE — 27201423 OPTIME MED/SURG SUP & DEVICES STERILE SUPPLY: Performed by: SURGERY

## 2020-06-11 PROCEDURE — 63600175 PHARM REV CODE 636 W HCPCS: Performed by: ANESTHESIOLOGY

## 2020-06-11 PROCEDURE — 88300 PR  SURG PATH,GROSS,LEVEL I: ICD-10-PCS | Mod: 26,,, | Performed by: PATHOLOGY

## 2020-06-11 PROCEDURE — 99232 SBSQ HOSP IP/OBS MODERATE 35: CPT | Mod: 57,,, | Performed by: SURGERY

## 2020-06-11 PROCEDURE — 99232 PR SUBSEQUENT HOSPITAL CARE,LEVL II: ICD-10-PCS | Mod: 57,,, | Performed by: SURGERY

## 2020-06-11 RX ORDER — SUCCINYLCHOLINE CHLORIDE 20 MG/ML
INJECTION INTRAMUSCULAR; INTRAVENOUS
Status: DISCONTINUED | OUTPATIENT
Start: 2020-06-11 | End: 2020-06-11

## 2020-06-11 RX ORDER — ACETAMINOPHEN 10 MG/ML
INJECTION, SOLUTION INTRAVENOUS
Status: DISCONTINUED | OUTPATIENT
Start: 2020-06-11 | End: 2020-06-11

## 2020-06-11 RX ORDER — KETOROLAC TROMETHAMINE 30 MG/ML
INJECTION, SOLUTION INTRAMUSCULAR; INTRAVENOUS
Status: DISCONTINUED | OUTPATIENT
Start: 2020-06-11 | End: 2020-06-11

## 2020-06-11 RX ORDER — ROCURONIUM BROMIDE 10 MG/ML
INJECTION, SOLUTION INTRAVENOUS
Status: DISCONTINUED | OUTPATIENT
Start: 2020-06-11 | End: 2020-06-11

## 2020-06-11 RX ORDER — PROPOFOL 10 MG/ML
VIAL (ML) INTRAVENOUS
Status: DISCONTINUED | OUTPATIENT
Start: 2020-06-11 | End: 2020-06-11

## 2020-06-11 RX ORDER — GLYCOPYRROLATE 0.2 MG/ML
INJECTION INTRAMUSCULAR; INTRAVENOUS
Status: DISCONTINUED | OUTPATIENT
Start: 2020-06-11 | End: 2020-06-11

## 2020-06-11 RX ORDER — HYDROMORPHONE HYDROCHLORIDE 2 MG/ML
0.5 INJECTION, SOLUTION INTRAMUSCULAR; INTRAVENOUS; SUBCUTANEOUS EVERY 5 MIN PRN
Status: DISPENSED | OUTPATIENT
Start: 2020-06-11 | End: 2020-06-11

## 2020-06-11 RX ORDER — ONDANSETRON 2 MG/ML
INJECTION INTRAMUSCULAR; INTRAVENOUS
Status: DISCONTINUED | OUTPATIENT
Start: 2020-06-11 | End: 2020-06-11

## 2020-06-11 RX ORDER — ONDANSETRON 2 MG/ML
4 INJECTION INTRAMUSCULAR; INTRAVENOUS DAILY PRN
Status: DISCONTINUED | OUTPATIENT
Start: 2020-06-11 | End: 2020-06-16 | Stop reason: HOSPADM

## 2020-06-11 RX ORDER — MIDAZOLAM HYDROCHLORIDE 1 MG/ML
INJECTION, SOLUTION INTRAMUSCULAR; INTRAVENOUS
Status: DISCONTINUED | OUTPATIENT
Start: 2020-06-11 | End: 2020-06-11

## 2020-06-11 RX ORDER — SODIUM CHLORIDE, SODIUM LACTATE, POTASSIUM CHLORIDE, CALCIUM CHLORIDE 600; 310; 30; 20 MG/100ML; MG/100ML; MG/100ML; MG/100ML
INJECTION, SOLUTION INTRAVENOUS CONTINUOUS PRN
Status: DISCONTINUED | OUTPATIENT
Start: 2020-06-11 | End: 2020-06-11

## 2020-06-11 RX ORDER — BUPIVACAINE HYDROCHLORIDE 5 MG/ML
INJECTION, SOLUTION PERINEURAL
Status: DISCONTINUED | OUTPATIENT
Start: 2020-06-11 | End: 2020-06-11 | Stop reason: HOSPADM

## 2020-06-11 RX ORDER — LIDOCAINE HCL/PF 100 MG/5ML
SYRINGE (ML) INTRAVENOUS
Status: DISCONTINUED | OUTPATIENT
Start: 2020-06-11 | End: 2020-06-11

## 2020-06-11 RX ORDER — NEOSTIGMINE METHYLSULFATE 1 MG/ML
INJECTION, SOLUTION INTRAVENOUS
Status: DISCONTINUED | OUTPATIENT
Start: 2020-06-11 | End: 2020-06-11

## 2020-06-11 RX ORDER — FENTANYL CITRATE 50 UG/ML
INJECTION, SOLUTION INTRAMUSCULAR; INTRAVENOUS
Status: DISCONTINUED | OUTPATIENT
Start: 2020-06-11 | End: 2020-06-11

## 2020-06-11 RX ADMIN — GLYCOPYRROLATE 0.1 MG: 0.2 INJECTION, SOLUTION INTRAMUSCULAR; INTRAVENOUS at 04:06

## 2020-06-11 RX ADMIN — CLONAZEPAM 0.5 MG: 0.5 TABLET ORAL at 08:06

## 2020-06-11 RX ADMIN — CLONAZEPAM 0.5 MG: 0.5 TABLET ORAL at 09:06

## 2020-06-11 RX ADMIN — GLYCOPYRROLATE 0.7 MG: 0.2 INJECTION, SOLUTION INTRAMUSCULAR; INTRAVENOUS at 05:06

## 2020-06-11 RX ADMIN — SODIUM CHLORIDE, SODIUM LACTATE, POTASSIUM CHLORIDE, AND CALCIUM CHLORIDE: .6; .31; .03; .02 INJECTION, SOLUTION INTRAVENOUS at 07:06

## 2020-06-11 RX ADMIN — PROPOFOL 200 MG: 10 INJECTION, EMULSION INTRAVENOUS at 04:06

## 2020-06-11 RX ADMIN — ROCURONIUM BROMIDE 25 MG: 10 INJECTION, SOLUTION INTRAVENOUS at 04:06

## 2020-06-11 RX ADMIN — FENTANYL CITRATE 50 MCG: 50 INJECTION, SOLUTION INTRAMUSCULAR; INTRAVENOUS at 05:06

## 2020-06-11 RX ADMIN — ALPRAZOLAM 0.5 MG: 0.25 TABLET ORAL at 08:06

## 2020-06-11 RX ADMIN — LIDOCAINE HYDROCHLORIDE 100 MG: 20 INJECTION, SOLUTION INTRAVENOUS at 04:06

## 2020-06-11 RX ADMIN — HYDROMORPHONE HYDROCHLORIDE 0.5 MG: 2 INJECTION, SOLUTION INTRAMUSCULAR; INTRAVENOUS; SUBCUTANEOUS at 06:06

## 2020-06-11 RX ADMIN — OXYCODONE HYDROCHLORIDE 10 MG: 5 TABLET ORAL at 11:06

## 2020-06-11 RX ADMIN — BUPROPION HYDROCHLORIDE 100 MG: 100 TABLET, FILM COATED, EXTENDED RELEASE ORAL at 08:06

## 2020-06-11 RX ADMIN — OXYCODONE HYDROCHLORIDE 10 MG: 5 TABLET ORAL at 06:06

## 2020-06-11 RX ADMIN — FENTANYL CITRATE 100 MCG: 50 INJECTION, SOLUTION INTRAMUSCULAR; INTRAVENOUS at 04:06

## 2020-06-11 RX ADMIN — ONDANSETRON 8 MG: 2 INJECTION, SOLUTION INTRAMUSCULAR; INTRAVENOUS at 04:06

## 2020-06-11 RX ADMIN — HYDROMORPHONE HYDROCHLORIDE 2 MG: 2 INJECTION, SOLUTION INTRAMUSCULAR; INTRAVENOUS; SUBCUTANEOUS at 01:06

## 2020-06-11 RX ADMIN — ROCURONIUM BROMIDE 5 MG: 10 INJECTION, SOLUTION INTRAVENOUS at 04:06

## 2020-06-11 RX ADMIN — GABAPENTIN 300 MG: 300 CAPSULE ORAL at 08:06

## 2020-06-11 RX ADMIN — OXYCODONE HYDROCHLORIDE 10 MG: 5 TABLET ORAL at 10:06

## 2020-06-11 RX ADMIN — OXYCODONE HYDROCHLORIDE 10 MG: 5 TABLET ORAL at 05:06

## 2020-06-11 RX ADMIN — SUCCINYLCHOLINE CHLORIDE 120 MG: 20 INJECTION, SOLUTION INTRAMUSCULAR; INTRAVENOUS at 04:06

## 2020-06-11 RX ADMIN — ACETAMINOPHEN 1000 MG: 500 TABLET ORAL at 09:06

## 2020-06-11 RX ADMIN — ALPRAZOLAM 0.5 MG: 0.25 TABLET ORAL at 09:06

## 2020-06-11 RX ADMIN — ACETAMINOPHEN 1000 MG: 10 INJECTION, SOLUTION INTRAVENOUS at 04:06

## 2020-06-11 RX ADMIN — KETOROLAC TROMETHAMINE 30 MG: 30 INJECTION, SOLUTION INTRAMUSCULAR; INTRAVENOUS at 04:06

## 2020-06-11 RX ADMIN — BUPROPION HYDROCHLORIDE 100 MG: 100 TABLET, FILM COATED, EXTENDED RELEASE ORAL at 09:06

## 2020-06-11 RX ADMIN — MIDAZOLAM 2 MG: 1 INJECTION INTRAMUSCULAR; INTRAVENOUS at 04:06

## 2020-06-11 RX ADMIN — SODIUM CHLORIDE, SODIUM LACTATE, POTASSIUM CHLORIDE, AND CALCIUM CHLORIDE: .6; .31; .03; .02 INJECTION, SOLUTION INTRAVENOUS at 04:06

## 2020-06-11 RX ADMIN — KETOROLAC TROMETHAMINE 15 MG: 30 INJECTION, SOLUTION INTRAMUSCULAR at 09:06

## 2020-06-11 RX ADMIN — NEOSTIGMINE METHYLSULFATE 5 MG: 1 INJECTION INTRAVENOUS at 05:06

## 2020-06-11 RX ADMIN — GABAPENTIN 300 MG: 300 CAPSULE ORAL at 09:06

## 2020-06-11 RX ADMIN — DEXTROSE 2 G: 50 INJECTION, SOLUTION INTRAVENOUS at 04:06

## 2020-06-11 RX ADMIN — HYDROMORPHONE HYDROCHLORIDE 2 MG: 2 INJECTION, SOLUTION INTRAMUSCULAR; INTRAVENOUS; SUBCUTANEOUS at 07:06

## 2020-06-11 NOTE — PLAN OF CARE
Problem: Fall Injury Risk  Goal: Absence of Fall and Fall-Related Injury  Outcome: Ongoing, Progressing     Problem: Adult Inpatient Plan of Care  Goal: Plan of Care Review  Outcome: Ongoing, Progressing  Goal: Patient-Specific Goal (Individualization)  Outcome: Ongoing, Progressing  Goal: Absence of Hospital-Acquired Illness or Injury  Outcome: Ongoing, Progressing  Goal: Optimal Comfort and Wellbeing  Outcome: Ongoing, Progressing  Goal: Readiness for Transition of Care  Outcome: Ongoing, Progressing  Goal: Rounds/Family Conference  Outcome: Ongoing, Progressing     Problem: Pain Acute  Goal: Optimal Pain Control  Outcome: Ongoing, Progressing     Problem: Infection  Goal: Infection Symptom Resolution  Outcome: Ongoing, Progressing     Problem: Bleeding (Surgery Nonspecified)  Goal: Absence of Bleeding  Outcome: Ongoing, Progressing     Problem: Bowel Elimination Impaired (Surgery Nonspecified)  Goal: Effective Bowel Elimination  Outcome: Ongoing, Progressing     Problem: Infection (Surgery Nonspecified)  Goal: Absence of Infection Signs/Symptoms  Outcome: Ongoing, Progressing     Problem: Ongoing Anesthesia Effects (Surgery Nonspecified)  Goal: Anesthesia/Sedation Recovery  Outcome: Ongoing, Progressing     Problem: Pain (Surgery Nonspecified)  Goal: Acceptable Pain Control  Outcome: Ongoing, Progressing     Problem: Postoperative Nausea and Vomiting (Surgery Nonspecified)  Goal: Nausea and Vomiting Relief  Outcome: Ongoing, Progressing     Problem: Postoperative Urinary Retention (Surgery Nonspecified)  Goal: Effective Urinary Elimination  Outcome: Ongoing, Progressing

## 2020-06-11 NOTE — PROGRESS NOTES
[facility]  PROGRESS NOTE    HPI: Cary King is a 29 y.o. female admitted for severe right lower quadrant abdominal pain of unknown etiology.  CT abdomen pelvis, pelvic ultrasound, labs, UA all within normal limits.    INTERVAL HISTORY: C/o severe 10/10 pain yesterday despite oxycodone. Initially refused Toradol and morphine stating these meds would not work for her but that Dilaudid would. C/o of 10/10 pain until given IV Dilaudid. No significant tachycardia recorded.  Seen resting in bed this morning but still complaining of severe pain.  Denies fevers or chills.  Pain seemed to worsen with p.o. intake yesterday.      VITALS:  Temp:  [97.1 °F (36.2 °C)-98.7 °F (37.1 °C)] 97.9 °F (36.6 °C)  Pulse:  [57-68] 68  Resp:  [17-20] 17  SpO2:  [96 %-97 %] 97 %  BP: ()/(52-65) 110/55    I&Os:  I/O last 3 completed shifts:  In: 275 [P.O.:275]  Out: 1350 [Urine:1350]    PHYSICAL EXAM:  GEN:  Mild distress, alert orient x3  HEENT:  Anicteric sclera  CV:  Regular rate rhythm  RESP:  Nonlabored breathing  ABD:  Soft, positive tenderness palpation in the right lower quadrant and right flank, localized guarding, no rebound, no peritonitis  EXT:  No edema    LABS:  CBC:   Recent Labs   Lab 06/10/20  0524 06/11/20  0530   WBC 5.12 4.09   RBC 3.48* 3.66*   HGB 10.9* 11.4*   HCT 33.7* 35.7*     --    MCV 97 98   MCH 31.3* 31.1*   MCHC 32.3 31.9*     BMP:   Recent Labs   Lab 06/11/20  0530   GLU 88      K 3.8      CO2 28   BUN 7   CREATININE 0.7   CALCIUM 8.3*     Labs within the past 24 hours have been reviewed.      ASSESSMENT & PLAN:  29 y.o. female with right lower quadrant abdominal pain, depression  - etiology of pain remains uncertain  - no change in her white blood cell count, no fevers no tachycardia however pain remained severe  - has been off of antibiotics  - due to ongoing severe pain will plan for diagnostic laparoscopy this afternoon, patient counseled risks and benefits  including the need to perform appendectomy, oophorectomy small-bowel resection, etc or the possibility of having negative diagnostic laparoscopy.  She is in agreement with the plan consent has been obtained  - continue scheduled Tylenol and Toradol, p.r.n. oxycodone and Dilaudid  -  keep NPO  - IV fluids at 75 cc/hour  - home dose of bupropion, clonazepam, Xanax and gabapentin have been reordered  - ambulate, SCDs

## 2020-06-11 NOTE — TRANSFER OF CARE
"Anesthesia Transfer of Care Note    Patient: Cary King    Procedure(s) Performed: Procedure(s) (LRB):  LAPAROSCOPY, DIAGNOSTIC (N/A)  LYSIS, ADHESIONS, LAPAROSCOPIC (N/A)  REMOVAL, MESH (N/A)    Patient location: PACU    Anesthesia Type: general    Transport from OR: Transported from OR on 6-10 L/min O2 by face mask with adequate spontaneous ventilation    Post pain: adequate analgesia    Post assessment: no apparent anesthetic complications and tolerated procedure well    Post vital signs: stable    Level of consciousness: sedated and responds to stimulation    Nausea/Vomiting: no nausea/vomiting    Complications: none    Transfer of care protocol was followed      Last vitals:   Visit Vitals  BP (!) 93/54   Pulse (!) 52   Temp 36.4 °C (97.5 °F) (Temporal)   Resp 13   Ht 5' 5" (1.651 m)   Wt 73.7 kg (162 lb 7.7 oz)   LMP 06/06/2020   SpO2 99%   Breastfeeding? No   BMI 27.04 kg/m²     "

## 2020-06-11 NOTE — OP NOTE
DATE OF PROCEDURE: 06/11/2020    PREOPERATIVE DIAGNOSIS:  Right lower quadrant abdominal pain    POSTOPERATIVE DIAGNOSIS:   1.  Intra-abdominal adhesions  2.  Small-bowel inflammation    PROCEDURE:   1.  Diagnostic laparoscopy  2.  Laparoscopic lysis of adhesions  3.  Removal intra-abdominal mesh    SURGEON: Khari Cardenas M.D    ASSISTANT: None    ANESTHESIA:  General    ESTIMATED BLOOD LOSS:  20 cc    SPECIMEN:  Intra-abdominal mesh for gross pathology    CONDITION:  Stable    COMPLICATIONS: None    FINDINGS:   1.  Significant omental adhesions to the anterior abdominal wall underneath the umbilicus where a piece intraperitoneal mesh was found to be densely adhered to the omentum but not appropriately approximated to the anterior abdominal wall.  Lysis of adhesions was performed and portions of the mesh were removed.  I was unable to completely free the mesh from the surrounding omentum and portions of the mesh were left intra-abdominal.  2.  There is diffuse intra-loop adhesions of the mid small bowel with mild inflammation    INDICATIONS: The patient is a 29-year-old female admitted for severe right lower quadrant pain but normal labs, CT scan and pelvic ultrasound.  Due to persistent symptoms patient taken back for diagnostic laparoscopy.    PROCEDURE IN DETAIL:  Informed consent was obtained.  Patient taken the operating room placed in supine position where general endotracheal intubation was achieved.  Cat catheter was placed.  Patient received 2 g of Ancef.  Her abdomen was prepped and draped typical sterile fashion.  A time-out performed by members of the operative team.  A Veress needle was inserted in to negrete's point and appropriate initial pressures were present.  Pneumoperitoneum was achieved.  The Veress needle was removed and a 5 mm Optiview trocar was placed after extending the skin incision.  Our insertion site was found to be free of injury.  Is immediately noticed that the patient had  significant amount of omental adhesions just below the umbilicus at the site of a previous umbilical hernia repair with mesh.  No obvious initial abnormality was noted.  Two additional 5 mm trocars were placed after injecting local anesthetic in the left lower lateral location and the left mid lateral location.  We then explored the abdomen.  A small splenule is noted in the left upper quadrant which appeared grossly normal.  The liver was mildly fatty but no other obvious abnormality was noted.  The gallbladder did not appear inflamed.  The stomach was grossly normal but distended from insufflation during intubation.  The colon appeared grossly normal without any signs of inflammation or perforation.  We identified the cecum and followed this and found the appendix which was grossly normal without any evidence of appendicitis.  The terminal ileum appeared grossly normal without any inflammation the uterus appeared somewhat inflamed but without any surrounding purulent fluid.  The ovaries appeared grossly normal without signs of torsion or abscess.  We then ran the small bowel from the terminal ileum to the ligament Treitz.  The midportion of the small bowel had numerous intra loop adhesions and mild inflammation but no evidence of an obstruction, no dilated bowel, no Meckel's diverticulum.  We re-examined the intra-abdominal he has into the anterior abdominal wall.  The previously placed mesh was intraperitoneal and found non adherent to the anterior abdominal wall.  Instead it was balled up with omentum.  We elected to take down these adhesions removed portion of the mesh.  There was a small portion mesh which we cannot removed to do dense adhesions to omentum just adjacent to the transverse colon.  Not wanting to injure the transverse colon this portion of the mesh was left in place.  It was surrounded by omentum and I did not feel this would lead to any erosion into the bowel.  We then removed the mesh in a  small portion of omentum through a 5 mm bag.  We then injected additional local anesthetic at her port sites and desufflated the abdomen removed our ports.  The incisions were closed with 4-0 Monocryl subcuticular stitches and glue.    DISPO:  Return for

## 2020-06-11 NOTE — ANESTHESIA PREPROCEDURE EVALUATION
06/11/2020  Cary King is a 29 y.o., female. Takes Gabapentin, Xanax, klonipin, and wellbutrin for mood stabilization for MDD. Met>4. Presents with abdominal pain scheduled for diagnostic lap under GETA    No current facility-administered medications on file prior to encounter.      Current Outpatient Medications on File Prior to Encounter   Medication Sig Dispense Refill    acetaminophen (TYLENOL) 650 MG TbSR Take 1 tablet (650 mg total) by mouth every 8 (eight) hours. 12 tablet 0    gabapentin (NEURONTIN) 300 MG capsule Take 300 mg by mouth 3 (three) times daily.      KLONOPIN 0.5 mg tablet Take 1 tablet by mouth 2 (two) times a day.      WELLBUTRIN  mg TBSR 12 hr tablet Take 1 tablet by mouth 2 (two) times a day.      albuterol 90 mcg/actuation inhaler Inhale 1-2 puffs into the lungs every 6 (six) hours as needed for Wheezing. 1 Inhaler 0     Past Medical History:   Diagnosis Date    Chronic bronchitis     Depression     Hepatitis C        Anesthesia Evaluation    I have reviewed the Patient Summary Reports.    I have reviewed the Nursing Notes.    I have reviewed the Medications.     Review of Systems  Anesthesia Hx:  No problems with previous Anesthesia Denies Hx of Anesthetic complications  History of prior surgery of interest to airway management or planning: Previous anesthesia: General Denies Family Hx of Anesthesia complications.   Denies Personal Hx of Anesthesia complications.   Social:  Non-Smoker, No Alcohol Use    Hematology/Oncology:  Hematology Normal   Oncology Normal     EENT/Dental:EENT/Dental Normal   Cardiovascular:  Cardiovascular Normal Exercise tolerance: good     Pulmonary:  Pulmonary Normal    Renal/:  Renal/ Normal     Hepatic/GI:  Hepatic/GI Normal    Musculoskeletal:  Musculoskeletal Normal    Neurological:  Neurology Normal    Endocrine:  Endocrine Normal     Dermatological:  Skin Normal    Psych:  Psychiatric Normal  Psychiatric History          Physical Exam  General:  Well nourished    Airway/Jaw/Neck:  Airway Findings: Mouth Opening: Normal Tongue: Normal  General Airway Assessment: Adult  Mallampati: II  Improves to II with phonation.  TM Distance: < 4 cm     Eyes/Ears/Nose:  EYES/EARS/NOSE FINDINGS: Normal   Dental:  Dental Findings: In tact   Chest/Lungs:  Chest/Lungs Clear    Heart/Vascular:  Heart Findings: Normal       Mental Status:  Mental Status Findings: Normal        Anesthesia Plan  Type of Anesthesia, risks & benefits discussed:  Anesthesia Type:  general  Patient's Preference:   Intra-op Monitoring Plan: standard ASA monitors  Intra-op Monitoring Plan Comments:   Post Op Pain Control Plan: multimodal analgesia  Post Op Pain Control Plan Comments:   Induction:   IV  Beta Blocker:  Patient is not currently on a Beta-Blocker (No further documentation required).       Informed Consent: Patient understands risks and agrees with Anesthesia plan.  Questions answered. Anesthesia consent signed with patient.  ASA Score: 2     Day of Surgery Review of History & Physical:  There are no significant changes.          Ready For Surgery From Anesthesia Perspective.

## 2020-06-11 NOTE — PLAN OF CARE
Permission attained to enter room via virtual system.  Virtual rounds completed as documented.  Today's lab values, notes, and vital signs up to now have been reviewed.  Pt is ready for surgery today   pre op teaching completed   emphasis spoken regarding early mobility   deep breathing and coughing  voiding post op   and to have help with initial ambulation out of the bed post op

## 2020-06-11 NOTE — NURSING
This nurse noted a cigarette smell coming from pt's room. When pt questioned if she was smoking in room, she denied. This nurse offered a nicotine patch but pt declined. Charge nurse Marj made aware of above.     1135 Security at bedside to search pt's belongings for lighter. Lighter and cigarettes removed and sent with security.

## 2020-06-11 NOTE — PLAN OF CARE
TN met with patient at bedside to complete discharge assessment. Currently, patient lives at home with her boyfriend and states that she is independent with ADL's. No DME or HH noted. Per patient, her boyfriend will provide transportation home and will be avaiable to help as needed.      TN  updated whiteboard with contact information. Discharge brochure given to patient. TN will follow throughout transitions of care and will assist with discharge needs.           06/11/20 1045   Discharge Assessment   Assessment Type Discharge Planning Assessment   Confirmed/corrected address and phone number on facesheet? Yes   Assessment information obtained from? Patient;Medical Record   Expected Length of Stay (days) 3   Communicated expected length of stay with patient/caregiver yes   Prior to hospitilization cognitive status: Alert/Oriented   Prior to hospitalization functional status: Independent   Current cognitive status: Alert/Oriented   Current Functional Status: Independent   Lives With significant other   Able to Return to Prior Arrangements yes   Is patient able to care for self after discharge? Yes   Patient's perception of discharge disposition home or selfcare   Readmission Within the Last 30 Days no previous admission in last 30 days   Patient currently being followed by outpatient case management? No   Patient currently receives any other outside agency services? No   Equipment Currently Used at Home none   Do you have any problems affording any of your prescribed medications? No   Is the patient taking medications as prescribed? yes   Does the patient have transportation home? Yes   Transportation Anticipated family or friend will provide   Does the patient receive services at the [facility]? No   Discharge Plan A Home   Discharge Plan B Home with family   DME Needed Upon Discharge  none   Patient/Family in Agreement with Plan yes

## 2020-06-12 LAB
ANION GAP SERPL CALC-SCNC: 3 MMOL/L (ref 8–16)
BASOPHILS # BLD AUTO: 0.02 K/UL (ref 0–0.2)
BASOPHILS NFR BLD: 0.3 % (ref 0–1.9)
BILIRUB UR QL STRIP: NEGATIVE
BUN SERPL-MCNC: 6 MG/DL (ref 6–20)
CALCIUM SERPL-MCNC: 8.3 MG/DL (ref 8.7–10.5)
CHLORIDE SERPL-SCNC: 105 MMOL/L (ref 95–110)
CLARITY UR: CLEAR
CO2 SERPL-SCNC: 32 MMOL/L (ref 23–29)
COLOR UR: YELLOW
CREAT SERPL-MCNC: 1.6 MG/DL (ref 0.5–1.4)
DIFFERENTIAL METHOD: ABNORMAL
EOSINOPHIL # BLD AUTO: 0.3 K/UL (ref 0–0.5)
EOSINOPHIL NFR BLD: 4.9 % (ref 0–8)
ERYTHROCYTE [DISTWIDTH] IN BLOOD BY AUTOMATED COUNT: 13.2 % (ref 11.5–14.5)
EST. GFR  (AFRICAN AMERICAN): 49 ML/MIN/1.73 M^2
EST. GFR  (NON AFRICAN AMERICAN): 43 ML/MIN/1.73 M^2
GLUCOSE SERPL-MCNC: 94 MG/DL (ref 70–110)
GLUCOSE UR QL STRIP: NEGATIVE
HCT VFR BLD AUTO: 35 % (ref 37–48.5)
HGB BLD-MCNC: 11.3 G/DL (ref 12–16)
HGB UR QL STRIP: NEGATIVE
IMM GRANULOCYTES # BLD AUTO: 0.02 K/UL (ref 0–0.04)
IMM GRANULOCYTES NFR BLD AUTO: 0.3 % (ref 0–0.5)
KETONES UR QL STRIP: NEGATIVE
LEUKOCYTE ESTERASE UR QL STRIP: NEGATIVE
LYMPHOCYTES # BLD AUTO: 1.4 K/UL (ref 1–4.8)
LYMPHOCYTES NFR BLD: 23.9 % (ref 18–48)
MCH RBC QN AUTO: 31.3 PG (ref 27–31)
MCHC RBC AUTO-ENTMCNC: 32.3 G/DL (ref 32–36)
MCV RBC AUTO: 97 FL (ref 82–98)
MONOCYTES # BLD AUTO: 0.6 K/UL (ref 0.3–1)
MONOCYTES NFR BLD: 9.4 % (ref 4–15)
NEUTROPHILS # BLD AUTO: 3.6 K/UL (ref 1.8–7.7)
NEUTROPHILS NFR BLD: 61.2 % (ref 38–73)
NITRITE UR QL STRIP: NEGATIVE
NRBC BLD-RTO: 0 /100 WBC
PH UR STRIP: 8 [PH] (ref 5–8)
PLATELET # BLD AUTO: 174 K/UL (ref 150–350)
PLATELET BLD QL SMEAR: ABNORMAL
PMV BLD AUTO: 11.2 FL (ref 9.2–12.9)
POTASSIUM SERPL-SCNC: 4.2 MMOL/L (ref 3.5–5.1)
PROT UR QL STRIP: NEGATIVE
RBC # BLD AUTO: 3.61 M/UL (ref 4–5.4)
SODIUM SERPL-SCNC: 140 MMOL/L (ref 136–145)
SP GR UR STRIP: 1.01 (ref 1–1.03)
URN SPEC COLLECT METH UR: NORMAL
UROBILINOGEN UR STRIP-ACNC: NEGATIVE EU/DL
WBC # BLD AUTO: 5.94 K/UL (ref 3.9–12.7)

## 2020-06-12 PROCEDURE — 25000003 PHARM REV CODE 250: Performed by: SURGERY

## 2020-06-12 PROCEDURE — 36415 COLL VENOUS BLD VENIPUNCTURE: CPT

## 2020-06-12 PROCEDURE — 87086 URINE CULTURE/COLONY COUNT: CPT

## 2020-06-12 PROCEDURE — 11000001 HC ACUTE MED/SURG PRIVATE ROOM

## 2020-06-12 PROCEDURE — 80048 BASIC METABOLIC PNL TOTAL CA: CPT

## 2020-06-12 PROCEDURE — 85025 COMPLETE CBC W/AUTO DIFF WBC: CPT

## 2020-06-12 PROCEDURE — 81003 URINALYSIS AUTO W/O SCOPE: CPT

## 2020-06-12 PROCEDURE — 63600175 PHARM REV CODE 636 W HCPCS: Performed by: SURGERY

## 2020-06-12 RX ORDER — HYDROMORPHONE HYDROCHLORIDE 2 MG/ML
2 INJECTION, SOLUTION INTRAMUSCULAR; INTRAVENOUS; SUBCUTANEOUS EVERY 4 HOURS PRN
Status: DISCONTINUED | OUTPATIENT
Start: 2020-06-13 | End: 2020-06-14

## 2020-06-12 RX ORDER — HYDROMORPHONE HYDROCHLORIDE 2 MG/ML
2 INJECTION, SOLUTION INTRAMUSCULAR; INTRAVENOUS; SUBCUTANEOUS EVERY 4 HOURS PRN
Status: DISCONTINUED | OUTPATIENT
Start: 2020-06-12 | End: 2020-06-12

## 2020-06-12 RX ORDER — TRAMADOL HYDROCHLORIDE 50 MG/1
50 TABLET ORAL EVERY 8 HOURS
Status: DISCONTINUED | OUTPATIENT
Start: 2020-06-12 | End: 2020-06-14

## 2020-06-12 RX ORDER — HYDROMORPHONE HYDROCHLORIDE 2 MG/ML
2 INJECTION, SOLUTION INTRAMUSCULAR; INTRAVENOUS; SUBCUTANEOUS ONCE
Status: COMPLETED | OUTPATIENT
Start: 2020-06-12 | End: 2020-06-12

## 2020-06-12 RX ADMIN — GABAPENTIN 300 MG: 300 CAPSULE ORAL at 02:06

## 2020-06-12 RX ADMIN — SODIUM CHLORIDE, SODIUM LACTATE, POTASSIUM CHLORIDE, AND CALCIUM CHLORIDE: .6; .31; .03; .02 INJECTION, SOLUTION INTRAVENOUS at 12:06

## 2020-06-12 RX ADMIN — GABAPENTIN 300 MG: 300 CAPSULE ORAL at 08:06

## 2020-06-12 RX ADMIN — OXYCODONE HYDROCHLORIDE 10 MG: 5 TABLET ORAL at 04:06

## 2020-06-12 RX ADMIN — CLONAZEPAM 0.5 MG: 0.5 TABLET ORAL at 09:06

## 2020-06-12 RX ADMIN — TRAMADOL HYDROCHLORIDE 50 MG: 50 TABLET, COATED ORAL at 01:06

## 2020-06-12 RX ADMIN — HYDROMORPHONE HYDROCHLORIDE 2 MG: 2 INJECTION, SOLUTION INTRAMUSCULAR; INTRAVENOUS; SUBCUTANEOUS at 06:06

## 2020-06-12 RX ADMIN — SODIUM CHLORIDE, SODIUM LACTATE, POTASSIUM CHLORIDE, AND CALCIUM CHLORIDE: .6; .31; .03; .02 INJECTION, SOLUTION INTRAVENOUS at 06:06

## 2020-06-12 RX ADMIN — SODIUM CHLORIDE, SODIUM LACTATE, POTASSIUM CHLORIDE, AND CALCIUM CHLORIDE 500 ML: .6; .31; .03; .02 INJECTION, SOLUTION INTRAVENOUS at 12:06

## 2020-06-12 RX ADMIN — TRAMADOL HYDROCHLORIDE 50 MG: 50 TABLET, COATED ORAL at 10:06

## 2020-06-12 RX ADMIN — HYDROMORPHONE HYDROCHLORIDE 2 MG: 2 INJECTION, SOLUTION INTRAMUSCULAR; INTRAVENOUS; SUBCUTANEOUS at 12:06

## 2020-06-12 RX ADMIN — HYDROMORPHONE HYDROCHLORIDE 2 MG: 2 INJECTION, SOLUTION INTRAMUSCULAR; INTRAVENOUS; SUBCUTANEOUS at 09:06

## 2020-06-12 RX ADMIN — KETOROLAC TROMETHAMINE 15 MG: 30 INJECTION, SOLUTION INTRAMUSCULAR at 06:06

## 2020-06-12 RX ADMIN — CLONAZEPAM 0.5 MG: 0.5 TABLET ORAL at 08:06

## 2020-06-12 RX ADMIN — ALPRAZOLAM 0.5 MG: 0.25 TABLET ORAL at 02:06

## 2020-06-12 RX ADMIN — HYDROMORPHONE HYDROCHLORIDE 2 MG: 2 INJECTION, SOLUTION INTRAMUSCULAR; INTRAVENOUS; SUBCUTANEOUS at 05:06

## 2020-06-12 RX ADMIN — ALPRAZOLAM 0.5 MG: 0.25 TABLET ORAL at 09:06

## 2020-06-12 RX ADMIN — OXYCODONE HYDROCHLORIDE 10 MG: 5 TABLET ORAL at 08:06

## 2020-06-12 RX ADMIN — ALPRAZOLAM 0.5 MG: 0.25 TABLET ORAL at 08:06

## 2020-06-12 RX ADMIN — BUPROPION HYDROCHLORIDE 100 MG: 100 TABLET, FILM COATED, EXTENDED RELEASE ORAL at 09:06

## 2020-06-12 RX ADMIN — GABAPENTIN 300 MG: 300 CAPSULE ORAL at 09:06

## 2020-06-12 RX ADMIN — BUPROPION HYDROCHLORIDE 100 MG: 100 TABLET, FILM COATED, EXTENDED RELEASE ORAL at 08:06

## 2020-06-12 RX ADMIN — OXYCODONE HYDROCHLORIDE 10 MG: 5 TABLET ORAL at 07:06

## 2020-06-12 RX ADMIN — OXYCODONE HYDROCHLORIDE 10 MG: 5 TABLET ORAL at 11:06

## 2020-06-12 RX ADMIN — OXYCODONE HYDROCHLORIDE 10 MG: 5 TABLET ORAL at 03:06

## 2020-06-12 RX ADMIN — ACETAMINOPHEN 1000 MG: 500 TABLET ORAL at 06:06

## 2020-06-12 NOTE — PLAN OF CARE
Patient ambulated to the bathroom with assistance. Medicated for pain with scheduled and PRN medications. SCD's in use. Patient given a dinner tray ate about 90% of tray plus candy and honey buns at bedside. Call light in reach, bed in low position, remains free from falls, bed alarm in use.

## 2020-06-12 NOTE — ANESTHESIA POSTPROCEDURE EVALUATION
Anesthesia Post Evaluation    Patient: Cary King    Procedure(s) Performed: Procedure(s) (LRB):  LAPAROSCOPY, DIAGNOSTIC (N/A)  LYSIS, ADHESIONS, LAPAROSCOPIC (N/A)  REMOVAL, MESH (N/A)    Final Anesthesia Type: general    Patient location during evaluation: PACU  Patient participation: Yes- Able to Participate  Level of consciousness: awake and alert  Post-procedure vital signs: reviewed and stable  Pain management: adequate  Airway patency: patent  KAYLI mitigation strategies: Multimodal analgesia  PONV status at discharge: No PONV  Anesthetic complications: no      Cardiovascular status: blood pressure returned to baseline and hemodynamically stable  Respiratory status: unassisted, spontaneous ventilation and room air  Hydration status: euvolemic  Follow-up not needed.          Vitals Value Taken Time   /67 6/11/2020  7:28 PM   Temp 36.4 °C (97.5 °F) 6/11/2020  5:40 PM   Pulse 64 6/11/2020  7:31 PM   Resp 26 6/11/2020  7:31 PM   SpO2 95 % 6/11/2020  7:31 PM   Vitals shown include unvalidated device data.      No case tracking events are documented in the log.      Pain/Jed Score: Pain Rating Prior to Med Admin: 10 (6/11/2020  6:48 PM)  Pain Rating Post Med Admin: 5 (6/11/2020  7:23 PM)  Jed Score: 9 (6/11/2020  7:23 PM)  Modified Jed Score: 17 (6/11/2020  5:40 PM)

## 2020-06-12 NOTE — PROGRESS NOTES
[facility]  PROGRESS NOTE    HPI: Cary King is a 29 y.o. female who presented with severe right lower quadrant abdominal and flank pain.  Labs and UA unremarkable.  CT scan and pelvic ultrasound unremarkable.    INTERVAL HISTORY:  Due to persistent severe pain the patient underwent diagnostic laparoscopy.  No obvious etiology of her pain was found.  Appendix appeared normal as well as the ileum.  There was some mild inflammation and numerous interloop adhesions of the small bowel and significant adhesions of omentum to a previous umbilical hernia mesh.  Patient has had persistent pain since surgery.  Denies nausea or vomiting.  Pain appears to be somewhat colicky in nature.  She denies nausea or vomiting, fevers or chills.  No blood in her urine.    VITALS:  Temp:  [97.5 °F (36.4 °C)-98.4 °F (36.9 °C)] 98.3 °F (36.8 °C)  Pulse:  [50-86] 70  Resp:  [12-21] 21  SpO2:  [94 %-100 %] 95 %  BP: ()/(50-95) 115/66    I&Os:  I/O last 3 completed shifts:  In: 2347.5 [P.O.:1265; I.V.:1082.5]  Out: 4000 [Urine:4000]    PHYSICAL EXAM:  GEN:  Distress from pain, alert and oriented  HEENT:  Anicteric sclera  CV:  Mild tachycardia, regular rate rhythm  RESP:  Nonlabored breathing  ABD:  Soft but tender to palpation the right lower quadrant radiating around to the right right flank, no mass or rebound  EXT:  No edema    LABS:  CBC:   Recent Labs   Lab 06/12/20  0518   WBC 5.94   RBC 3.61*   HGB 11.3*   HCT 35.0*      MCV 97   MCH 31.3*   MCHC 32.3     BMP:   Recent Labs   Lab 06/12/20  0518   GLU 94      K 4.2      CO2 32*   BUN 6   CREATININE 1.6*   CALCIUM 8.3*     Labs within the past 24 hours have been reviewed.      ASSESSMENT & PLAN:  29 y.o. female with severe right lower quadrant/flank pain of unknown etiology  - etiology remains unclear, no obvious source from diagnostic laparoscopy  - possible etiologies include colitis/enteritis (which patient has a vague history of being  diagnosed with colitis in the past), PID (though no abnormal fluid noted in the pelvis), pelvic thrombophlebitis, UTI, nephrolithiasis  - will order retroperitoneal ultrasound to evaluate for any obvious kidney ureteral abnormalities and will also as to assess for possible thrombophlebitis if able to visualize vascularity  - will consult GYN for possible PID or other gynecological cause of pain  - will consult GI for evaluation of possible enteritis or colitis  - will order lipase to rule out pancreatitis and procalcitonin to rule out infectious etiology  - if no obvious infectious etiologies identified we may consider a trial of steroids  - bolus 500 cc LR due to elevated creat  - continue pain meds as ordered  - diet as tolerated

## 2020-06-12 NOTE — PLAN OF CARE
Patient has met PACU discharge criteria, VSS, pain well controlled. Family updated by phone. Released from PACU by Dr. Oglesby

## 2020-06-12 NOTE — NURSING
Pt c/o 8/10 to abdomen. Pt not due for PRN pain med. Dr. Cardenas made aware. Orders to give dilaudid early received.

## 2020-06-13 LAB
ALBUMIN SERPL BCP-MCNC: 2.8 G/DL (ref 3.5–5.2)
ALP SERPL-CCNC: 71 U/L (ref 55–135)
ALT SERPL W/O P-5'-P-CCNC: 58 U/L (ref 10–44)
ANION GAP SERPL CALC-SCNC: 7 MMOL/L (ref 8–16)
AST SERPL-CCNC: 59 U/L (ref 10–40)
BACTERIA UR CULT: NO GROWTH
BASOPHILS # BLD AUTO: 0.01 K/UL (ref 0–0.2)
BASOPHILS NFR BLD: 0.2 % (ref 0–1.9)
BILIRUB DIRECT SERPL-MCNC: 0.1 MG/DL (ref 0.1–0.3)
BILIRUB SERPL-MCNC: 0.2 MG/DL (ref 0.1–1)
BUN SERPL-MCNC: 7 MG/DL (ref 6–20)
CALCIUM SERPL-MCNC: 8.5 MG/DL (ref 8.7–10.5)
CHLORIDE SERPL-SCNC: 104 MMOL/L (ref 95–110)
CO2 SERPL-SCNC: 27 MMOL/L (ref 23–29)
CREAT SERPL-MCNC: 0.8 MG/DL (ref 0.5–1.4)
DIFFERENTIAL METHOD: ABNORMAL
EOSINOPHIL # BLD AUTO: 0.4 K/UL (ref 0–0.5)
EOSINOPHIL NFR BLD: 6.2 % (ref 0–8)
ERYTHROCYTE [DISTWIDTH] IN BLOOD BY AUTOMATED COUNT: 13.2 % (ref 11.5–14.5)
EST. GFR  (AFRICAN AMERICAN): >60 ML/MIN/1.73 M^2
EST. GFR  (NON AFRICAN AMERICAN): >60 ML/MIN/1.73 M^2
GENTAMICIN SERPL-MCNC: 1.6 UG/ML
GLUCOSE SERPL-MCNC: 92 MG/DL (ref 70–110)
HCT VFR BLD AUTO: 32.8 % (ref 37–48.5)
HGB BLD-MCNC: 10.5 G/DL (ref 12–16)
IMM GRANULOCYTES # BLD AUTO: 0.01 K/UL (ref 0–0.04)
IMM GRANULOCYTES NFR BLD AUTO: 0.2 % (ref 0–0.5)
LIPASE SERPL-CCNC: 26 U/L (ref 4–60)
LYMPHOCYTES # BLD AUTO: 2 K/UL (ref 1–4.8)
LYMPHOCYTES NFR BLD: 35.7 % (ref 18–48)
MCH RBC QN AUTO: 30.9 PG (ref 27–31)
MCHC RBC AUTO-ENTMCNC: 32 G/DL (ref 32–36)
MCV RBC AUTO: 97 FL (ref 82–98)
MONOCYTES # BLD AUTO: 0.6 K/UL (ref 0.3–1)
MONOCYTES NFR BLD: 10.9 % (ref 4–15)
NEUTROPHILS # BLD AUTO: 2.7 K/UL (ref 1.8–7.7)
NEUTROPHILS NFR BLD: 46.8 % (ref 38–73)
NRBC BLD-RTO: 0 /100 WBC
PLATELET # BLD AUTO: 248 K/UL (ref 150–350)
PMV BLD AUTO: 9.7 FL (ref 9.2–12.9)
POTASSIUM SERPL-SCNC: 4.4 MMOL/L (ref 3.5–5.1)
PROCALCITONIN SERPL IA-MCNC: <0.02 NG/ML
PROT SERPL-MCNC: 5.5 G/DL (ref 6–8.4)
RBC # BLD AUTO: 3.4 M/UL (ref 4–5.4)
SODIUM SERPL-SCNC: 138 MMOL/L (ref 136–145)
WBC # BLD AUTO: 5.68 K/UL (ref 3.9–12.7)

## 2020-06-13 PROCEDURE — 25000003 PHARM REV CODE 250: Performed by: SURGERY

## 2020-06-13 PROCEDURE — 94761 N-INVAS EAR/PLS OXIMETRY MLT: CPT

## 2020-06-13 PROCEDURE — 36415 COLL VENOUS BLD VENIPUNCTURE: CPT

## 2020-06-13 PROCEDURE — 80076 HEPATIC FUNCTION PANEL: CPT

## 2020-06-13 PROCEDURE — 63600175 PHARM REV CODE 636 W HCPCS: Performed by: OBSTETRICS & GYNECOLOGY

## 2020-06-13 PROCEDURE — 80170 ASSAY OF GENTAMICIN: CPT

## 2020-06-13 PROCEDURE — 85025 COMPLETE CBC W/AUTO DIFF WBC: CPT

## 2020-06-13 PROCEDURE — 25000003 PHARM REV CODE 250: Performed by: OBSTETRICS & GYNECOLOGY

## 2020-06-13 PROCEDURE — 99900035 HC TECH TIME PER 15 MIN (STAT)

## 2020-06-13 PROCEDURE — 11000001 HC ACUTE MED/SURG PRIVATE ROOM

## 2020-06-13 PROCEDURE — 80048 BASIC METABOLIC PNL TOTAL CA: CPT

## 2020-06-13 PROCEDURE — 63600175 PHARM REV CODE 636 W HCPCS: Performed by: SURGERY

## 2020-06-13 PROCEDURE — 83690 ASSAY OF LIPASE: CPT

## 2020-06-13 PROCEDURE — 99232 SBSQ HOSP IP/OBS MODERATE 35: CPT | Mod: ,,, | Performed by: OBSTETRICS & GYNECOLOGY

## 2020-06-13 PROCEDURE — 84145 PROCALCITONIN (PCT): CPT

## 2020-06-13 PROCEDURE — 99232 PR SUBSEQUENT HOSPITAL CARE,LEVL II: ICD-10-PCS | Mod: ,,, | Performed by: OBSTETRICS & GYNECOLOGY

## 2020-06-13 RX ORDER — HYDROMORPHONE HYDROCHLORIDE 2 MG/ML
2 INJECTION, SOLUTION INTRAMUSCULAR; INTRAVENOUS; SUBCUTANEOUS ONCE
Status: COMPLETED | OUTPATIENT
Start: 2020-06-13 | End: 2020-06-13

## 2020-06-13 RX ORDER — CYCLOBENZAPRINE HCL 5 MG
5 TABLET ORAL 3 TIMES DAILY
Status: DISCONTINUED | OUTPATIENT
Start: 2020-06-13 | End: 2020-06-16 | Stop reason: HOSPADM

## 2020-06-13 RX ORDER — CLINDAMYCIN PHOSPHATE 900 MG/50ML
900 INJECTION, SOLUTION INTRAVENOUS
Status: DISCONTINUED | OUTPATIENT
Start: 2020-06-13 | End: 2020-06-16

## 2020-06-13 RX ORDER — MORPHINE SULFATE 2 MG/ML
5 INJECTION, SOLUTION INTRAMUSCULAR; INTRAVENOUS EVERY 4 HOURS PRN
Status: DISCONTINUED | OUTPATIENT
Start: 2020-06-13 | End: 2020-06-14

## 2020-06-13 RX ORDER — HYDROMORPHONE HYDROCHLORIDE 2 MG/1
2 TABLET ORAL EVERY 4 HOURS PRN
Status: DISCONTINUED | OUTPATIENT
Start: 2020-06-13 | End: 2020-06-14

## 2020-06-13 RX ORDER — LIDOCAINE 50 MG/G
1 PATCH TOPICAL DAILY
Status: DISCONTINUED | OUTPATIENT
Start: 2020-06-13 | End: 2020-06-16 | Stop reason: HOSPADM

## 2020-06-13 RX ORDER — DOCUSATE SODIUM 100 MG/1
100 CAPSULE, LIQUID FILLED ORAL 2 TIMES DAILY
Status: DISCONTINUED | OUTPATIENT
Start: 2020-06-13 | End: 2020-06-16 | Stop reason: HOSPADM

## 2020-06-13 RX ADMIN — OXYCODONE HYDROCHLORIDE 10 MG: 5 TABLET ORAL at 03:06

## 2020-06-13 RX ADMIN — CLONAZEPAM 0.5 MG: 0.5 TABLET ORAL at 08:06

## 2020-06-13 RX ADMIN — HYDROMORPHONE HYDROCHLORIDE 2 MG: 2 INJECTION, SOLUTION INTRAMUSCULAR; INTRAVENOUS; SUBCUTANEOUS at 05:06

## 2020-06-13 RX ADMIN — HYDROMORPHONE HYDROCHLORIDE 2 MG: 2 INJECTION, SOLUTION INTRAMUSCULAR; INTRAVENOUS; SUBCUTANEOUS at 01:06

## 2020-06-13 RX ADMIN — MORPHINE SULFATE 5 MG: 2 INJECTION, SOLUTION INTRAMUSCULAR; INTRAVENOUS at 06:06

## 2020-06-13 RX ADMIN — ALPRAZOLAM 0.5 MG: 0.25 TABLET ORAL at 08:06

## 2020-06-13 RX ADMIN — ALPRAZOLAM 0.5 MG: 0.25 TABLET ORAL at 09:06

## 2020-06-13 RX ADMIN — CLINDAMYCIN IN 5 PERCENT DEXTROSE 900 MG: 18 INJECTION, SOLUTION INTRAVENOUS at 12:06

## 2020-06-13 RX ADMIN — HYDROMORPHONE HYDROCHLORIDE 2 MG: 2 TABLET ORAL at 07:06

## 2020-06-13 RX ADMIN — SODIUM CHLORIDE, SODIUM LACTATE, POTASSIUM CHLORIDE, AND CALCIUM CHLORIDE: .6; .31; .03; .02 INJECTION, SOLUTION INTRAVENOUS at 10:06

## 2020-06-13 RX ADMIN — HYDROMORPHONE HYDROCHLORIDE 2 MG: 2 TABLET ORAL at 08:06

## 2020-06-13 RX ADMIN — HYDROMORPHONE HYDROCHLORIDE 2 MG: 2 INJECTION, SOLUTION INTRAMUSCULAR; INTRAVENOUS; SUBCUTANEOUS at 02:06

## 2020-06-13 RX ADMIN — BUPROPION HYDROCHLORIDE 100 MG: 100 TABLET, FILM COATED, EXTENDED RELEASE ORAL at 08:06

## 2020-06-13 RX ADMIN — HYDROMORPHONE HYDROCHLORIDE 2 MG: 2 INJECTION, SOLUTION INTRAMUSCULAR; INTRAVENOUS; SUBCUTANEOUS at 09:06

## 2020-06-13 RX ADMIN — TRAMADOL HYDROCHLORIDE 50 MG: 50 TABLET, COATED ORAL at 06:06

## 2020-06-13 RX ADMIN — GENTAMICIN SULFATE 285.2 MG: 40 INJECTION, SOLUTION INTRAMUSCULAR; INTRAVENOUS at 01:06

## 2020-06-13 RX ADMIN — ALPRAZOLAM 0.5 MG: 0.25 TABLET ORAL at 03:06

## 2020-06-13 RX ADMIN — GABAPENTIN 300 MG: 300 CAPSULE ORAL at 09:06

## 2020-06-13 RX ADMIN — CLINDAMYCIN IN 5 PERCENT DEXTROSE 900 MG: 18 INJECTION, SOLUTION INTRAVENOUS at 09:06

## 2020-06-13 RX ADMIN — BUPROPION HYDROCHLORIDE 100 MG: 100 TABLET, FILM COATED, EXTENDED RELEASE ORAL at 09:06

## 2020-06-13 RX ADMIN — ACETAMINOPHEN 1000 MG: 500 TABLET ORAL at 09:06

## 2020-06-13 RX ADMIN — CYCLOBENZAPRINE HYDROCHLORIDE 5 MG: 5 TABLET, FILM COATED ORAL at 09:06

## 2020-06-13 RX ADMIN — GABAPENTIN 300 MG: 300 CAPSULE ORAL at 08:06

## 2020-06-13 RX ADMIN — DOCUSATE SODIUM 100 MG: 100 CAPSULE, LIQUID FILLED ORAL at 08:06

## 2020-06-13 RX ADMIN — LIDOCAINE 1 PATCH: 50 PATCH TOPICAL at 08:06

## 2020-06-13 RX ADMIN — MORPHINE SULFATE 5 MG: 2 INJECTION, SOLUTION INTRAMUSCULAR; INTRAVENOUS at 10:06

## 2020-06-13 RX ADMIN — TRAMADOL HYDROCHLORIDE 50 MG: 50 TABLET, COATED ORAL at 01:06

## 2020-06-13 RX ADMIN — DOCUSATE SODIUM 100 MG: 100 CAPSULE, LIQUID FILLED ORAL at 09:06

## 2020-06-13 RX ADMIN — HYDROMORPHONE HYDROCHLORIDE 2 MG: 2 TABLET ORAL at 11:06

## 2020-06-13 RX ADMIN — ACETAMINOPHEN 1000 MG: 500 TABLET ORAL at 06:06

## 2020-06-13 RX ADMIN — GABAPENTIN 300 MG: 300 CAPSULE ORAL at 03:06

## 2020-06-13 RX ADMIN — ACETAMINOPHEN 1000 MG: 500 TABLET ORAL at 01:06

## 2020-06-13 RX ADMIN — HYDROMORPHONE HYDROCHLORIDE 2 MG: 2 TABLET ORAL at 04:06

## 2020-06-13 RX ADMIN — TRAMADOL HYDROCHLORIDE 50 MG: 50 TABLET, COATED ORAL at 09:06

## 2020-06-13 RX ADMIN — CLONAZEPAM 0.5 MG: 0.5 TABLET ORAL at 09:06

## 2020-06-13 NOTE — CONSULTS
[facility]  Obstetrics & Gynecology  Consult Note    Patient Name: Cary King  MRN: [id number]  Admission Date: 6/9/2020  Hospital Length of Stay: 2 days  Code Status: Full Code  Primary Care Provider: Primary Doctor No  Principal Problem: RLQ abdominal pain    Inpatient consult to Gynecology  Consult performed by: Shirin Calhoun MD  Consult ordered by: Khari Cardenas Jr., MD  Reason for consult: RLQ pain PID  Assessment/Recommendations:   1. PID--history previous and recent but has a tubal ligation so often seen less in these patients therefore maybe more of an endometritis. Will start antibiotics (Gent and Clinda) to treat as if PID but patient is afebrile and no leukocytosis.      2. Musculoskeletal component as patient points to multiple areas on abdomen as well as hip tenderness bilaterally on exam and asymmetric pelvic crests. May benefit from a muscle relaxer        Subjective:     Chief Complaint: RLQ pain.     History of Present Illness: RLQ pain that started 2 weeks after her period. States pain became worse over last few days. States she has been treated several times for PID. Previous vaginal deliveries x 3 and a tubal ligation. States she just started douching. Denies abnormal Pap smears and maybe has had an STD but unsure but definitely PID several times. Has not seen a GYN in an unknown amount of time.       No current facility-administered medications on file prior to encounter.      Current Outpatient Medications on File Prior to Encounter   Medication Sig    acetaminophen (TYLENOL) 650 MG TbSR Take 1 tablet (650 mg total) by mouth every 8 (eight) hours.    gabapentin (NEURONTIN) 300 MG capsule Take 300 mg by mouth 3 (three) times daily.    KLONOPIN 0.5 mg tablet Take 1 tablet by mouth 2 (two) times a day.    WELLBUTRIN  mg TBSR 12 hr tablet Take 1 tablet by mouth 2 (two) times a day.    albuterol 90 mcg/actuation inhaler Inhale 1-2 puffs into the lungs every 6  (six) hours as needed for Wheezing.       Review of patient's allergies indicates:   Allergen Reactions    Naproxen Hives    Tessalon [benzonatate] Swelling    Ciprofloxacin Rash       Past Medical History:   Diagnosis Date    Chronic bronchitis     Depression     Hepatitis C      OB History    Para Term  AB Living   2 2 0 0 0 0   SAB TAB Ectopic Multiple Live Births   0 0 0 0 0      # Outcome Date GA Lbr Anselmo/2nd Weight Sex Delivery Anes PTL Lv   2 Para            1 Para              Past Surgical History:   Procedure Laterality Date    DIAGNOSTIC LAPAROSCOPY N/A 2020    Procedure: LAPAROSCOPY, DIAGNOSTIC;  Surgeon: Khari Cardenas Jr., MD;  Location: [facility] OR;  Service: General;  Laterality: N/A;    HERNIA REPAIR      LAPAROSCOPIC LYSIS OF ADHESIONS N/A 2020    Procedure: LYSIS, ADHESIONS, LAPAROSCOPIC;  Surgeon: Khari Cardenas Jr., MD;  Location: [facility] OR;  Service: General;  Laterality: N/A;    REMOVAL OF MESH N/A 2020    Procedure: REMOVAL, MESH;  Surgeon: Khari Cardenas Jr., MD;  Location: [facility] OR;  Service: General;  Laterality: N/A;    TUBAL LIGATION       Family History     None        Tobacco Use    Smoking status: Current Every Day Smoker     Packs/day: 1.00     Years: 15.00     Pack years: 15.00     Types: Cigarettes     Start date:     Smokeless tobacco: Never Used    Tobacco comment: Ambulatory referral to Smoking Cessation program.    Substance and Sexual Activity    Alcohol use: No    Drug use: Not Currently     Types: Marijuana    Sexual activity: Yes     Partners: Male     Birth control/protection: None     Review of Systems   Constitutional: Negative.    Gastrointestinal: Positive for abdominal pain. Negative for constipation, diarrhea, nausea and vomiting.   Genitourinary: Positive for vaginal discharge. Negative for dysuria and vaginal bleeding.   Musculoskeletal: Positive for back pain.     Objective:     Vital Signs (Most  Recent):  Temp: 97.8 °F (36.6 °C) (06/13/20 1152)  Pulse: 73 (06/13/20 1152)  Resp: 18 (06/13/20 1152)  BP: 126/82 (06/13/20 1152)  SpO2: 99 % (06/13/20 1152) Vital Signs (24h Range):  Temp:  [97.2 °F (36.2 °C)-99.1 °F (37.3 °C)] 97.8 °F (36.6 °C)  Pulse:  [61-74] 73  Resp:  [18-20] 18  SpO2:  [95 %-100 %] 99 %  BP: (114-135)/(71-85) 126/82     Weight: 80 kg (176 lb 5.9 oz)  Body mass index is 29.35 kg/m².  Patient's last menstrual period was 06/06/2020.    Physical Exam:   Constitutional: She appears well-developed and well-nourished.   Sitting up talking on phone       Cardiovascular: Normal rate, regular rhythm and normal heart sounds.    +CMT and more pain on the left than on the right side but adnexa difficult to palpate secondary to voluntary guarding.    Pulmonary/Chest: Effort normal and breath sounds normal. No respiratory distress.        Abdominal: Soft. She exhibits abdominal incision. She exhibits no mass. There is abdominal tenderness. There is guarding.   Patient has a hard time pin pointing an exact location of pain but most consistently points to right flank area even as high as the rib cage. Multiple well healed incision sites. Asymmetric pelvic crests. Voluntary guarding. Bilateral hip tenderness. When asked for worst location she points to right side between level of umbilicus and rib cage.     Genitourinary:    Uterus normal.      Genitourinary Comments: +CMT and more pain on the left than on the right side. Adnexa difficult to palpate secondary to voluntary guarding.                       Laboratory:  CBC:   Recent Labs   Lab 06/13/20  0436   WBC 5.68   RBC 3.40*   HGB 10.5*   HCT 32.8*      MCV 97   MCH 30.9   MCHC 32.0        Diagnostic Results:  Labs: Reviewed  CBC, GC/CT and wet mount. Ultrasound and CT scan also reviewed    Assessment/Plan:     Active Diagnoses:    Diagnosis Date Noted POA    PRINCIPAL PROBLEM:  RLQ abdominal pain [R10.31] 06/09/2020 Yes    Depression [F32.9]  06/10/2020 Yes    Right lower quadrant abdominal pain [R10.31] 06/09/2020 Yes      Problems Resolved During this Admission:     Assessment/Recommendations:   1. PID--history previous and recent but has a tubal ligation so often seen less in these patients therefore maybe more of an endometritis. Will start antibiotics (Gent and Clinda) to treat as if PID but patient is afebrile and no leukocytosis. If improves can go home on oral medications      2. Musculoskeletal component as patient points to multiple areas on abdomen as well as hip tenderness bilaterally on exam and asymmetric pelvic crests. May benefit from a muscle relaxer      Thank you for your consult. I will follow-up with patient. Please contact us if you have any additional questions.    Shirin Calhoun MD  Obstetrics & Gynecology  [facility]

## 2020-06-13 NOTE — PLAN OF CARE
Patient has complained of pain and medicated frequently overnight with mild relief. Hot packs given to apply to RLQ pain. Fluids infusing overnight. Patient ambulating to the bathroom with stand by assistance. No complaints of nausea or vomiting. Call light in reach, bed in low position. Remains free from falls, bed alarm in use.

## 2020-06-13 NOTE — PROGRESS NOTES
[facility]  PROGRESS NOTE    HPI: Cary King is a 29 y.o. female who presented with severe right lower quadrant abdominal and flank pain.  Labs and UA unremarkable.  CT scan and pelvic ultrasound unremarkable. Due to persistent severe pain the patient underwent diagnostic laparoscopy.  No obvious etiology of her pain was found.  Appendix appeared normal as well as the ileum.  There was some mild inflammation and numerous interloop adhesions of the mid small bowel.  She had significant omental adhesions to mesh from a previous umbilical hernia repair, the mesh was found to be folded and not adhered to the anterior abdominal wall there for the majority the mesh was removed after lysis of adhesions was performed.  The patient's ovaries appeared grossly normal and she is status post tubal ligation.  There is no obvious abscess or torsion.  The uterus was somewhat enlarged and possibly mildly inflamed but there is no purulence or turbid fluid in the pelvic cul-de-sac.  The gallbladder appeared grossly normal.      INTERVAL HISTORY:  The patient continues to have severe colicky pain in the right flank and right lower quadrant.  She is tolerating a diet.  Denies nausea or vomiting.  Pain is only relieved by Dilaudid IV.  Denies vaginal drainage or change in urine.  No blood or mucus in her stool.  No diarrhea.    VITALS:  Temp:  [97.2 °F (36.2 °C)-99.1 °F (37.3 °C)] 97.9 °F (36.6 °C)  Pulse:  [61-74] 72  Resp:  [18-20] 18  SpO2:  [95 %-100 %] 96 %  BP: (114-135)/(66-85) 120/72    I&Os:  I/O last 3 completed shifts:  In: 3012.5 [P.O.:1130; I.V.:1882.5]  Out: 4200 [Urine:4200]    PHYSICAL EXAM:  GEN:  Distress from pain, alert and oriented  HEENT:  Anicteric sclera  CV:  Regular rate rhythm  RESP:  Nonlabored breathing  ABD:  Soft but tender to palpation the right lower quadrant radiating around to the right right flank, no mass or rebound  EXT:  No edema    LABS:  CBC:   Recent Labs   Lab 06/13/20  0436    WBC 5.68   RBC 3.40*   HGB 10.5*   HCT 32.8*      MCV 97   MCH 30.9   MCHC 32.0     BMP:   Recent Labs   Lab 06/12/20  0518   GLU 94      K 4.2      CO2 32*   BUN 6   CREATININE 1.6*   CALCIUM 8.3*     Labs within the past 24 hours have been reviewed.      Urine culture:  In process      Retroperitoneal ultrasound:  Right kidney: The kidney measures 11 in length by 5.5 x 5.8 cm in transverse dimensions.  No hydronephrosis or nephrolithiasis.  There is nonspecific increased echogenicity of the renal pyramids bilaterally.     The resistive index  is within normal limits measures 0.65     Left kidney: The kidney measures 11.5 in length by 5.9 x 5.8 cm in transverse dimensions.   No hydronephrosis or nephrolithiasis.  Nonspecific echogenicity renal pyramids bilaterally.     The resistive index  is within normal limits measures 0.68     Unremarkable spot imaging urinary bladder without definite bladder wall thickening.     Impression:     Normal size kidneys without hydronephrosis or nephrolithiasis.     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  Clinical correlation advised.      ASSESSMENT & PLAN:  29 y.o. female with severe right lower quadrant/flank pain of unknown etiology  - etiology remains unclear, no obvious source from diagnostic laparoscopy  - repeat UA was clean, culture was sent any ways and is in process  - retroperitoneal ultrasound failed to show any nephrolithiasis or hydronephrosis or other etiology for pain  - possible etiologies include colitis/enteritis (which patient has a vague history of being diagnosed with colitis in the past), PID (though no abnormal fluid noted in the pelvis), pelvic thrombophlebitis, UTI, nephrolithiasis  - will order pelvic vascular ultrasound to evaluate for possible thrombophlebitis   - will consult GYN for possible PID or other gynecological cause of pain  - will consult GI  for evaluation of possible enteritis or colitis  - will order lipase to rule out pancreatitis and procalcitonin to rule out infectious etiology  - if no obvious infectious etiologies identified we may consider a trial of steroids  - continue Dilaudid tabs and Dilaudid IV for pain  - diet as tolerated for now

## 2020-06-13 NOTE — NURSING
Dr. Cardenas notified of patient's RUQ abd pain 10/10; new order received to give dilaudid 2mg now and then every 4 hours prn.  Updated CHRISTINA Jo.

## 2020-06-13 NOTE — PLAN OF CARE
VIRTUAL NURSE:  Cued into patient's room.  Permission received per patient to turn camera to view patient.  Introduced as VN for night shift that will be working with floor nurse and nursing assistant.  Educated patient on VN's role in patient care. Plan of care reviewed with patient. Education per flowsheet.  Opportunity given for questions and questions answered.  C/o excruciating pain to RUQ of abd and cannot receive dilaudid for another hour.  Will notify Dr. Cradenas.  Instructed to call for assistance.  Will cont to monitor.    Labs, notes, and orders reviewed.

## 2020-06-13 NOTE — PLAN OF CARE
[facility]  Consult for abdominal pain, without diarrhea, or blood in stool. Remote colitis?  Presented with concern for sugical abdomen, s/p exp lap without lesions other than adhesions. CT, U/s without pathology. Being treated for PID. Pain could have functional GI and/or abdominal wall. No  If pain does not resolve with abx will consider colonoscopy inpatient vs outpatient depending on course.    Jared Pandya MD

## 2020-06-13 NOTE — CONSULTS
"[facility]    CC: Abdominal pain x 6 days    HPI 29 y.o. female with a past medical history of Hepatitis C, and bipolar depression and a reported previous history of colitis in 2015 who presented to [facility] with acute, progressively worsening, right lower quadrant pain the radiated to her back is associated with nausea but no vomiting, and is worsened by food. The pain continued to worsen and migrated up to include her right upper quadrant and expanded across her lower back. She notes that she started her period the day before her pain started and that she had an abbreviated 2 day menstrual cycle with heavy bleeding which is unusual for her. She otherwise denies having any associated symptoms. The pain is constant with a 9/10 severity with intermittent "shocks" in her right flank. She was admitted to the surgery service and had a pelvic ultrasound, an abdominal CT with contrast,  A retroperitoneal ultrasound, and an ultrasound of her liver with doppler, all of which were unremarkable. She has been afebrile and without a WBC count. She underwent and exploratory laparoscopy completed with surgery which showed some bowel adhesions associated with her mesh which were lysed. GI was consulted for further evaluation of her abdominal pain.    Past Medical History   has a past medical history of Chronic bronchitis, Depression, and Hepatitis C.    Past Surgical History   has a past surgical history that includes Hernia repair; Tubal ligation; Diagnostic laparoscopy (N/A, 6/11/2020); Laparoscopic lysis of adhesions (N/A, 6/11/2020); and Removal of mesh (N/A, 6/11/2020).    Social History  Social History     Tobacco Use    Smoking status: Current Every Day Smoker     Packs/day: 1.00     Years: 15.00     Pack years: 15.00     Types: Cigarettes     Start date: 2005    Smokeless tobacco: Never Used    Tobacco comment: Ambulatory referral to Smoking Cessation program.    Substance Use Topics    Alcohol use: No    Drug " "use: Not Currently     Types: Marijuana       Family History  Denies family history of colon cancer or bowel disease    Review of Systems  General ROS: negative for chills, fever or weight loss  Psychological ROS: negative for hallucination, + depression  Ophthalmic ROS: negative for blurry vision, photophobia or eye pain  ENT ROS: negative for epistaxis, sore throat or rhinorrhea  Respiratory ROS: no cough, shortness of breath, or wheezing  Cardiovascular ROS: no chest pain or dyspnea on exertion  Gastrointestinal ROS: + abdominal pain, no change in bowel habits, or black/ bloody stools, + Nausea, but no vomiting  Genito-Urinary ROS: no dysuria, trouble voiding, or hematuria  Musculoskeletal ROS: negative for gait disturbance or muscular weakness  Neurological ROS: no syncope or seizures; no ataxia  Dermatological ROS: negative for pruritis, rash and jaundice    Physical Examination  BP (!) 151/93 (BP Location: Right arm, Patient Position: Sitting)   Pulse 67   Temp 98 °F (36.7 °C) (Oral)   Resp 20   Ht 5' 5" (1.651 m)   Wt 80 kg (176 lb 5.9 oz)   LMP 06/06/2020   SpO2 96%   Breastfeeding No   BMI 29.35 kg/m²   General appearance: alert, cooperative, mild distress associated with her abdominal pain   HENT: Normocephalic, atraumatic, neck symmetrical, no nasal discharge   Eyes: conjunctivae/corneas clear, PERRL, EOM's intact  Lungs: clear to auscultation in all fields, no dullness to percussion bilaterally, no wheeze  Heart: normal rate, regular rhythm without rub; palpable peripheral pulses  Abdomen: soft, tenderness to palpation on the right that radiates to her right upper quadrant right flank and across her lower back in a band like distribution ; bowel sounds normoactive; no organomegaly  Extremities: extremities symmetric; no clubbing, cyanosis, or edema  Integument: Skin color, texture, turgor normal; no rashes; hair distrubution normal  Neurologic: Alert and oriented X 3, normal strength, normal " coordination  Psychiatric: no pressured speech; normal affect; no evidence of impaired cognition     Labs:    (6/12/2020)  WBC 5.68  Hgb 10.5  Hematocrit 32.8   Plt 248    Na 138   K 4.4   Cl 104   CO2 27  BUN 7   Creatinine 0.8    Imaging:    CT abdomen with contrast no acute intra-abdominal process identified     Pelvic ultrasound unremarkable except for density noted in right lower quadrant potentially representing the appendix.     Right upper abdominal exam unremarkable     US of retroperitoneal unremarkable       I have personally reviewed these images    Assessment:   29 year old female with a past medical history of Bipolar disorder, Hepatitis C untreated, and a reported previous history of unspecified colitis in 2015 who presents with a 6 day history of right lower and upper quadrant pain that radiates to her back with unremarkable imaging and unrevealing lab work. She underwent an exploratory laparoscopy which showed abdominal adhesions but was otherwise unremarkable.     Plan:  -Unclear etiology at this time  -Agree with OBGYN consult and antibiotics for PID  - If patient's symptoms do not improve with antibiotic therapy then she may benefit from a colonoscopy. This could be arranged as inpatient vs outpatient depending on patient's clinical course.     Case discussed with Dr. Ya Dumont MD   [facility] PGY3  Gastroenterology service

## 2020-06-13 NOTE — PROGRESS NOTES
Pharmacokinetic Initial Assessment: Gentamicin    Assessment:  Weight utilized for dose calculation: Ideal Body Weight  Dosing method utilized: extended interval dosing    Plan: Extended interval dosing regimen: Gentamicin 285 mg IV once, followed by a random level to be drawn on 6/13/2020 at 2100, 8-12 hours after the first dose.    Pharmacy will continue to monitor.    Please contact pharmacy at extension [phone] with any questions regarding this assessment.    Thank you for the consult,    Damaris Durán       Patient brief summary:  Cary King is a 29 y.o. female initiated on aminoglycoside therapy for treatment of suspected  PID    Drug Allergies:   Review of patient's allergies indicates:   Allergen Reactions    Naproxen Hives    Tessalon [benzonatate] Swelling    Ciprofloxacin Rash       Actual Body Weight:   80 kg    Adjust Body Weight:   66.2 kg    Ideal Body Weight:  57 kg    Renal Function:   Estimated Creatinine Clearance: 107.5 mL/min (based on SCr of 0.8 mg/dL).,     Dialysis Method (if applicable):  N/A    CBC (last 72 hours):  Recent Labs   Lab Result Units 06/11/20  0530 06/12/20  0518 06/13/20  0436   WBC K/uL 4.09 5.94 5.68   Hemoglobin g/dL 11.4* 11.3* 10.5*   Hematocrit % 35.7* 35.0* 32.8*   Platelets K/uL 148* 174 248   Gran% % 44.3 61.2 46.8   Lymph% % 38.6 23.9 35.7   Mono% % 11.2 9.4 10.9   Eosinophil% % 5.4 4.9 6.2   Basophil% % 0.5 0.3 0.2   Differential Method  Automated Automated Automated       Metabolic Panel (last 72 hours):  Recent Labs   Lab Result Units 06/11/20  0530 06/12/20  0518 06/12/20  1553 06/13/20  0436   Sodium mmol/L 139 140  --  138   Potassium mmol/L 3.8 4.2  --  4.4   Chloride mmol/L 106 105  --  104   CO2 mmol/L 28 32*  --  27   Glucose mg/dL 88 94  --  92   Glucose, UA   --   --  Negative  --    BUN, Bld mg/dL 7 6  --  7   Creatinine mg/dL 0.7 1.6*  --  0.8   Albumin g/dL  --   --   --  2.8*   Total Bilirubin mg/dL  --   --   --  0.2   Alkaline Phosphatase U/L   --   --   --  71   AST U/L  --   --   --  59*   ALT U/L  --   --   --  58*       Microbiologic Results:  Microbiology Results (last 7 days)       Procedure Component Value Units Date/Time    Urine Culture High Risk [[id number]] Collected: 06/12/20 1224    Order Status: Sent Specimen: Urine, Clean Catch Updated: 06/12/20 1731    C. trachomatis/N. gonorrhoeae by AMP DNA [facility]; Cervix [[id number]] Collected: 06/09/20 1418    Order Status: Completed Specimen: Genital Updated: 06/10/20 2007     Chlamydia, Amplified DNA Not Detected     N gonorrhoeae, amplified DNA Not Detected    Narrative:      Sources by Resulting Lab:->[facility]

## 2020-06-13 NOTE — NURSING
This nurse was Notified by lab that MD put asked for a procalcitonin and orders needed to be put in. This nurse verified orders with MD and put in the orders per Dr. tapia.

## 2020-06-13 NOTE — NURSING
Pt requesting morphine at this time. MD notified. MD states that he previously had the pt on morphine and the pt stated that morphine did not help her pain and pt requested dilaudid. MD is placing an order for morphine at this time. Morphine 5 mg every 4 hrs.

## 2020-06-14 LAB
ANION GAP SERPL CALC-SCNC: 5 MMOL/L (ref 8–16)
BASOPHILS # BLD AUTO: 0.02 K/UL (ref 0–0.2)
BASOPHILS NFR BLD: 0.4 % (ref 0–1.9)
BUN SERPL-MCNC: 5 MG/DL (ref 6–20)
CALCIUM SERPL-MCNC: 8.7 MG/DL (ref 8.7–10.5)
CHLORIDE SERPL-SCNC: 103 MMOL/L (ref 95–110)
CO2 SERPL-SCNC: 29 MMOL/L (ref 23–29)
CREAT SERPL-MCNC: 0.8 MG/DL (ref 0.5–1.4)
DIFFERENTIAL METHOD: ABNORMAL
EOSINOPHIL # BLD AUTO: 0.3 K/UL (ref 0–0.5)
EOSINOPHIL NFR BLD: 6.2 % (ref 0–8)
ERYTHROCYTE [DISTWIDTH] IN BLOOD BY AUTOMATED COUNT: 13.3 % (ref 11.5–14.5)
EST. GFR  (AFRICAN AMERICAN): >60 ML/MIN/1.73 M^2
EST. GFR  (NON AFRICAN AMERICAN): >60 ML/MIN/1.73 M^2
GLUCOSE SERPL-MCNC: 101 MG/DL (ref 70–110)
HCG INTACT+B SERPL-ACNC: <1.2 MIU/ML
HCG INTACT+B SERPL-ACNC: <1.2 MIU/ML
HCT VFR BLD AUTO: 33.2 % (ref 37–48.5)
HGB BLD-MCNC: 10.7 G/DL (ref 12–16)
IMM GRANULOCYTES # BLD AUTO: 0.01 K/UL (ref 0–0.04)
IMM GRANULOCYTES NFR BLD AUTO: 0.2 % (ref 0–0.5)
LYMPHOCYTES # BLD AUTO: 1.8 K/UL (ref 1–4.8)
LYMPHOCYTES NFR BLD: 37.5 % (ref 18–48)
MCH RBC QN AUTO: 30.9 PG (ref 27–31)
MCHC RBC AUTO-ENTMCNC: 32.2 G/DL (ref 32–36)
MCV RBC AUTO: 96 FL (ref 82–98)
MONOCYTES # BLD AUTO: 0.6 K/UL (ref 0.3–1)
MONOCYTES NFR BLD: 11.6 % (ref 4–15)
NEUTROPHILS # BLD AUTO: 2.1 K/UL (ref 1.8–7.7)
NEUTROPHILS NFR BLD: 44.1 % (ref 38–73)
NRBC BLD-RTO: 0 /100 WBC
PLATELET # BLD AUTO: 248 K/UL (ref 150–350)
PMV BLD AUTO: 9.9 FL (ref 9.2–12.9)
POTASSIUM SERPL-SCNC: 4.5 MMOL/L (ref 3.5–5.1)
RBC # BLD AUTO: 3.46 M/UL (ref 4–5.4)
SODIUM SERPL-SCNC: 137 MMOL/L (ref 136–145)
WBC # BLD AUTO: 4.83 K/UL (ref 3.9–12.7)

## 2020-06-14 PROCEDURE — 11000001 HC ACUTE MED/SURG PRIVATE ROOM

## 2020-06-14 PROCEDURE — 63600175 PHARM REV CODE 636 W HCPCS: Performed by: SURGERY

## 2020-06-14 PROCEDURE — 80048 BASIC METABOLIC PNL TOTAL CA: CPT

## 2020-06-14 PROCEDURE — 36415 COLL VENOUS BLD VENIPUNCTURE: CPT

## 2020-06-14 PROCEDURE — 25000003 PHARM REV CODE 250: Performed by: OBSTETRICS & GYNECOLOGY

## 2020-06-14 PROCEDURE — 25000003 PHARM REV CODE 250: Performed by: SURGERY

## 2020-06-14 PROCEDURE — 99900035 HC TECH TIME PER 15 MIN (STAT)

## 2020-06-14 PROCEDURE — 85025 COMPLETE CBC W/AUTO DIFF WBC: CPT

## 2020-06-14 PROCEDURE — 63600175 PHARM REV CODE 636 W HCPCS: Performed by: OBSTETRICS & GYNECOLOGY

## 2020-06-14 PROCEDURE — 94761 N-INVAS EAR/PLS OXIMETRY MLT: CPT

## 2020-06-14 PROCEDURE — 84702 CHORIONIC GONADOTROPIN TEST: CPT

## 2020-06-14 PROCEDURE — 94770 HC EXHALED C02 TEST: CPT

## 2020-06-14 RX ORDER — HYDROMORPHONE HCL IN 0.9% NACL 6 MG/30 ML
PATIENT CONTROLLED ANALGESIA SYRINGE INTRAVENOUS CONTINUOUS
Status: DISCONTINUED | OUTPATIENT
Start: 2020-06-14 | End: 2020-06-16

## 2020-06-14 RX ORDER — GABAPENTIN 300 MG/1
600 CAPSULE ORAL 3 TIMES DAILY
Status: DISCONTINUED | OUTPATIENT
Start: 2020-06-14 | End: 2020-06-16 | Stop reason: HOSPADM

## 2020-06-14 RX ORDER — NALOXONE HCL 0.4 MG/ML
0.02 VIAL (ML) INJECTION
Status: DISCONTINUED | OUTPATIENT
Start: 2020-06-14 | End: 2020-06-16 | Stop reason: HOSPADM

## 2020-06-14 RX ORDER — KETOROLAC TROMETHAMINE 10 MG/1
10 TABLET, FILM COATED ORAL EVERY 8 HOURS
Status: DISCONTINUED | OUTPATIENT
Start: 2020-06-14 | End: 2020-06-16 | Stop reason: HOSPADM

## 2020-06-14 RX ORDER — NALOXONE HCL 0.4 MG/ML
0.4 VIAL (ML) INJECTION
Status: DISCONTINUED | OUTPATIENT
Start: 2020-06-14 | End: 2020-06-14 | Stop reason: SDUPTHER

## 2020-06-14 RX ORDER — POLYETHYLENE GLYCOL 3350 17 G/17G
17 POWDER, FOR SOLUTION ORAL DAILY
Status: DISCONTINUED | OUTPATIENT
Start: 2020-06-15 | End: 2020-06-15

## 2020-06-14 RX ADMIN — CLONAZEPAM 0.5 MG: 0.5 TABLET ORAL at 09:06

## 2020-06-14 RX ADMIN — HYDROMORPHONE HYDROCHLORIDE 2 MG: 2 INJECTION, SOLUTION INTRAMUSCULAR; INTRAVENOUS; SUBCUTANEOUS at 11:06

## 2020-06-14 RX ADMIN — DOCUSATE SODIUM 100 MG: 100 CAPSULE, LIQUID FILLED ORAL at 09:06

## 2020-06-14 RX ADMIN — LIDOCAINE 1 PATCH: 50 PATCH TOPICAL at 01:06

## 2020-06-14 RX ADMIN — MORPHINE SULFATE 5 MG: 2 INJECTION, SOLUTION INTRAMUSCULAR; INTRAVENOUS at 09:06

## 2020-06-14 RX ADMIN — TRAMADOL HYDROCHLORIDE 50 MG: 50 TABLET, COATED ORAL at 05:06

## 2020-06-14 RX ADMIN — ALPRAZOLAM 0.5 MG: 0.25 TABLET ORAL at 03:06

## 2020-06-14 RX ADMIN — MORPHINE SULFATE 5 MG: 2 INJECTION, SOLUTION INTRAMUSCULAR; INTRAVENOUS at 03:06

## 2020-06-14 RX ADMIN — HYDROMORPHONE HYDROCHLORIDE 2 MG: 2 INJECTION, SOLUTION INTRAMUSCULAR; INTRAVENOUS; SUBCUTANEOUS at 07:06

## 2020-06-14 RX ADMIN — BUPROPION HYDROCHLORIDE 100 MG: 100 TABLET, FILM COATED, EXTENDED RELEASE ORAL at 09:06

## 2020-06-14 RX ADMIN — HYDROMORPHONE HYDROCHLORIDE 2 MG: 2 INJECTION, SOLUTION INTRAMUSCULAR; INTRAVENOUS; SUBCUTANEOUS at 03:06

## 2020-06-14 RX ADMIN — Medication: at 07:06

## 2020-06-14 RX ADMIN — ALPRAZOLAM 0.5 MG: 0.25 TABLET ORAL at 09:06

## 2020-06-14 RX ADMIN — KETOROLAC TROMETHAMINE 10 MG: 10 TABLET, FILM COATED ORAL at 09:06

## 2020-06-14 RX ADMIN — HYDROMORPHONE HYDROCHLORIDE 2 MG: 2 TABLET ORAL at 06:06

## 2020-06-14 RX ADMIN — GABAPENTIN 600 MG: 300 CAPSULE ORAL at 09:06

## 2020-06-14 RX ADMIN — CLINDAMYCIN IN 5 PERCENT DEXTROSE 900 MG: 18 INJECTION, SOLUTION INTRAVENOUS at 03:06

## 2020-06-14 RX ADMIN — ACETAMINOPHEN 1000 MG: 500 TABLET ORAL at 09:06

## 2020-06-14 RX ADMIN — GABAPENTIN 600 MG: 300 CAPSULE ORAL at 04:06

## 2020-06-14 RX ADMIN — CLINDAMYCIN IN 5 PERCENT DEXTROSE 900 MG: 18 INJECTION, SOLUTION INTRAVENOUS at 11:06

## 2020-06-14 RX ADMIN — MORPHINE SULFATE 5 MG: 2 INJECTION, SOLUTION INTRAMUSCULAR; INTRAVENOUS at 01:06

## 2020-06-14 RX ADMIN — GENTAMICIN SULFATE 285.2 MG: 40 INJECTION, SOLUTION INTRAMUSCULAR; INTRAVENOUS at 04:06

## 2020-06-14 RX ADMIN — MORPHINE SULFATE 5 MG: 2 INJECTION, SOLUTION INTRAMUSCULAR; INTRAVENOUS at 05:06

## 2020-06-14 RX ADMIN — KETOROLAC TROMETHAMINE 10 MG: 10 TABLET, FILM COATED ORAL at 01:06

## 2020-06-14 RX ADMIN — HYDROMORPHONE HYDROCHLORIDE 2 MG: 2 INJECTION, SOLUTION INTRAMUSCULAR; INTRAVENOUS; SUBCUTANEOUS at 02:06

## 2020-06-14 RX ADMIN — CLINDAMYCIN IN 5 PERCENT DEXTROSE 900 MG: 18 INJECTION, SOLUTION INTRAVENOUS at 09:06

## 2020-06-14 NOTE — PROGRESS NOTES
Pharmacokinetic Follow Up: Gentamicin    Assessment of levels:   Random concentration of 1.6 mcg/mL (8 hours post-infusion) corresponds to a dosing interval of every 24 hours per the [facility]  Trough ordered but not drawn before second dose administered. Will order trough prior to next dose on 6/15 at approximately 1530.     Regimen Plan:   Continue current dose: Gentamicin 285.2 mg mg IV every 24 hours. MD ordered 5 mg/kg based on ideal body weight to treat PID.     Drug levels (last 3 results):  No results for input(s): AMIKACINPEAK, AMIKACINTROU, AMIKACINRAND, AMIKACIN in the last 72 hours.    No results for input(s): GENTAMICIN, GENTPEAK, GENTTROUGH, GENT10, GENT12, GENT8, GENTRANDOM in the last 72 hours.    No results for input(s): TOBRA8, TOBRA10, TOBRA12, TOBRARND, TOBRAMYCIN, TOBRAPEAK, TOBRATROUGH, TOBRAMYCINPE, TOBRAMYCINRA, TOBRAMYCINTR in the last 72 hours.    Pharmacy will continue to monitor.    Please contact pharmacy at extension [phone] with any questions regarding this assessment.    Thank you for the consult,   Juliana Live      Patient brief summary:  Cary King is a 29 y.o. female initiated on aminoglycoside therapy for treatment of  PID    Drug Allergies:   Review of patient's allergies indicates:   Allergen Reactions    Naproxen Hives    Tessalon [benzonatate] Swelling    Ciprofloxacin Rash       Actual Body Weight:   81 kg    Adjust Body Weight:   66.6 kg    Ideal Body Weight:  57 kg    Renal Function:   Estimated Creatinine Clearance: 108.1 mL/min (based on SCr of 0.8 mg/dL).,     Dialysis Method (if applicable):  N/A    CBC (last 72 hours):  Recent Labs   Lab Result Units 06/12/20  0518 06/13/20  0436 06/14/20  0524   WBC K/uL 5.94 5.68 4.83   Hemoglobin g/dL 11.3* 10.5* 10.7*   Hematocrit % 35.0* 32.8* 33.2*   Platelets K/uL 174 248 248   Gran% % 61.2 46.8 44.1   Lymph% % 23.9 35.7 37.5   Mono% % 9.4 10.9 11.6   Eosinophil% % 4.9 6.2 6.2   Basophil% % 0.3 0.2 0.4    Differential Method  Automated Automated Automated       Metabolic Panel (last 72 hours):  Recent Labs   Lab Result Units 06/12/20  0518 06/12/20  1553 06/13/20  0436 06/14/20  0524   Sodium mmol/L 140  --  138 137   Potassium mmol/L 4.2  --  4.4 4.5   Chloride mmol/L 105  --  104 103   CO2 mmol/L 32*  --  27 29   Glucose mg/dL 94  --  92 101   Glucose, UA   --  Negative  --   --    BUN, Bld mg/dL 6  --  7 5*   Creatinine mg/dL 1.6*  --  0.8 0.8   Albumin g/dL  --   --  2.8*  --    Total Bilirubin mg/dL  --   --  0.2  --    Alkaline Phosphatase U/L  --   --  71  --    AST U/L  --   --  59*  --    ALT U/L  --   --  58*  --        Aminoglycoside Administrations:  aminoglycosides given in last 96 hours                     gentamicin (GARAMYCIN) 285.2 mg in sodium chloride 0.9% 100 mL IVPB (mg) 285.2 mg New Bag 06/14/20 1601    gentamicin (GARAMYCIN) 285.2 mg in sodium chloride 0.9% 100 mL IVPB (mg) 285.2 mg New Bag 06/13/20 1315                    Microbiologic Results:  Microbiology Results (last 7 days)       Procedure Component Value Units Date/Time    Urine Culture High Risk [[id number]] Collected: 06/12/20 1224    Order Status: Completed Specimen: Urine, Clean Catch Updated: 06/13/20 2036     Urine Culture, Routine No growth    Narrative:      Indicated criteria for high risk culture:->Other  Other (specify):->severe right flank pain, negative  diagnostic laparoscopy, h/o UTI    C. trachomatis/N. gonorrhoeae by AMP DNA [facility]; Cervix [[id number]] Collected: 06/09/20 1418    Order Status: Completed Specimen: Genital Updated: 06/10/20 2007     Chlamydia, Amplified DNA Not Detected     N gonorrhoeae, amplified DNA Not Detected    Narrative:      Sources by Resulting Lab:->[facility]

## 2020-06-14 NOTE — PLAN OF CARE
Pt aaox4. Pt with c/o severe pain. Pain being controlled per MAR with PRN pain med. Pt went to ultrasound today and was seen by OBGYN. Safety precautions maintained. Call light and personal belongings in reach.

## 2020-06-14 NOTE — PROGRESS NOTES
[facility]  PROGRESS NOTE    HPI: Cary King is a 29 y.o. female who presented with severe right lower quadrant abdominal and flank pain.  Labs and UA unremarkable.  CT scan and pelvic ultrasound unremarkable. Due to persistent severe pain the patient underwent diagnostic laparoscopy.  No obvious etiology of her pain was found.  Appendix appeared normal as well as the ileum.  There was some mild inflammation and numerous interloop adhesions of the mid small bowel.  She had significant omental adhesions to mesh from a previous umbilical hernia repair, the mesh was found to be folded and not adhered to the anterior abdominal wall there for the majority the mesh was removed after lysis of adhesions was performed.  The patient's ovaries appeared grossly normal and she is status post tubal ligation.  There is no obvious abscess or torsion.  The uterus was somewhat enlarged and possibly mildly inflamed but there is no purulence or turbid fluid in the pelvic cul-de-sac.  The gallbladder appeared grossly normal.      INTERVAL HISTORY:  Still with continued pain and patient requesting IV pain medication frequently.  Has refused to take other nonnarcotic and p.o. narcotic pain medications.   She is tolerating a diet.  Denies nausea or vomiting.      VITALS:  Temp:  [96.2 °F (35.7 °C)-98.5 °F (36.9 °C)] 96.2 °F (35.7 °C)  Pulse:  [65-79] 72  Resp:  [16-20] 18  SpO2:  [96 %-99 %] 99 %  BP: (125-151)/(81-93) 130/84    I&Os:  I/O last 3 completed shifts:  In: 1457.5 [P.O.:320; I.V.:1037.5; IV Piggyback:100]  Out: 4150 [Urine:4150]    PHYSICAL EXAM:  GEN:  Distress from pain, alert and oriented  HEENT:  Anicteric sclera  CV:  Regular rate rhythm  RESP:  Nonlabored breathing  ABD:  Soft but tender to palpation the right lower quadrant radiating around to the right right flank, no mass or rebound, port incision sites well approximated without signs of infection, no rash overlying the area or concerns for  shingles  EXT:  No edema    LABS:  CBC:   Recent Labs   Lab 06/14/20  0524   WBC 4.83   RBC 3.46*   HGB 10.7*   HCT 33.2*      MCV 96   MCH 30.9   MCHC 32.2     BMP:   Recent Labs   Lab 06/14/20  0524         K 4.5      CO2 29   BUN 5*   CREATININE 0.8   CALCIUM 8.7     Labs within the past 24 hours have been reviewed.    Urine culture:  No growth today      ASSESSMENT & PLAN:  29 y.o. female with severe right lower quadrant/flank pain of unknown etiology    - etiology remains unclear, no obvious source from diagnostic laparoscopy  - repeat UA was clean, culture was sent any ways and is in process  - remains afebrile with normal white blood cell count and not elevated procalcitonin  - retroperitoneal ultrasound failed to show any nephrolithiasis or hydronephrosis or other etiology for pain  - possible etiologies include colitis/enteritis (which patient has a vague history of being diagnosed with colitis in the past), PID (though no abnormal fluid noted in the pelvis), pelvic thrombophlebitis, UTI, nephrolithiasis  - GYN and GI consulted yesterday, I truly appreciate their input  - Patient had significant pain and CMT during pelvic exam, also with history of PID, likely with recurrent PID or endometriosis and gentamicin and clindamycin have been started  - If symptoms fail to improve with antibiotic therapy for PID GI may consider endoscopy for further evaluation  - if no obvious infectious etiologies identified we may consider a trial of steroids  - questionable drug-seeking behavior as the patient has repeatedly refused p.o. narcotic and nonnarcotic pain medication and is repetitively asking for only IV pain medication.  In addition at night the patient has reported severe 10/10 pain and requested pain medication however upon entering the room the nurse noted the patient was asleep.   - for now will continue with the current pain regimen p.r.n. IV Dilaudid or morphine, p.o. Dilaudid tabs.   We will continue her on schedule Tylenol and Toradol.  Flexeril 5 mg t.i.d. has been added and her gabapentin dose has been increased to 600 mg t.i.d..  Lidocaine patch has also been ordered.

## 2020-06-14 NOTE — PROGRESS NOTES
"[facility]    CC: Abdominal pain x 6 days     HPI 29 y.o. female with a past medical history of Hepatitis C, and bipolar depression and a reported previous history of colitis in 2015 who presented to [facility] with acute, progressively worsening, right lower quadrant pain the radiated to her back is associated with nausea but no vomiting, and is worsened by food. The pain continued to worsen and migrated up to include her right upper quadrant and expanded across her lower back. She notes that she started her period the day before her pain started and that she had an abbreviated 2 day menstrual cycle with heavy bleeding which is unusual for her. She otherwise denies having any associated symptoms. The pain is constant with a 9/10 severity with intermittent "shocks" in her right flank. She was admitted to the surgery service and had a pelvic ultrasound, an abdominal CT with contrast,  A retroperitoneal ultrasound, and an ultrasound of her liver with doppler, all of which were unremarkable. She has been afebrile and without a WBC count. She underwent and exploratory laparoscopy completed with surgery which showed some bowel adhesions associated with her mesh which were lysed. GI was consulted for further evaluation of her abdominal pain.    Interval History:  Tolerate diet yesterday but hasn't eaten this morning. Complaining of severe abdominal pain on the left abdomen, hip, and flank. When asked if she has sensitivity of her skin, she says no. Has not had a bowel movement since yesterday.    Past Medical History    has a past medical history of Chronic bronchitis, Depression, and Hepatitis C.    Review of Systems  General ROS: negative for - chills, fever or weight loss  Cardiovascular ROS: no chest pain or dyspnea on exertion  Gastrointestinal ROS: Positive for abdominal pain, no change in bowel habits, or black/ bloody stools    Physical Examination  /81 (BP Location: Right arm, Patient Position: Lying)  " " Pulse 65   Temp 97.9 °F (36.6 °C) (Oral)   Resp 18   Ht 5' 5" (1.651 m)   Wt 81 kg (178 lb 9.2 oz)   LMP 06/06/2020   SpO2 97%   Breastfeeding No   BMI 29.72 kg/m²   General appearance: alert, cooperative, no distress, appearted  HENT: Normocephalic, atraumatic, neck symmetrical, no nasal discharge   Lungs: clear to auscultation in all fields, symmetric chest wall expansion bilaterally, no wheeze, rale, or rhonchi  Heart: normal rate, regular rhythm without rub; palpable peripheral pulsesI   Abdomen: soft, yelled out when I touched the skin over her abdomen on the right  Extremities: extremities symmetric; no clubbing, cyanosis, or edema  Neurologic: Alert and oriented X 3, normal strength, normal coordination    Assessment:   29 year old female with a past medical history of Bipolar disorder, Hepatitis C untreated, and a reported previous history of unspecified colitis in 2015 who presents with a 6 day history of right lower and upper quadrant pain that radiates to her back with unremarkable imaging and unrevealing lab work. She underwent an exploratory laparoscopy which showed abdominal adhesions but was otherwise unremarkable. No diarrhea and imaging negative. OBGYN treating empirically for PID. Consider functional GI or abdominal wall.     Plan:  - consider IP vs OP colonoscopy if pain continues      Tawny Garcia MD MPH  [facility] PGY 5  [phone] cell  [phone] pager        "

## 2020-06-14 NOTE — PLAN OF CARE
Patient resting in bed, AAOx4. IVF infusing as ordered. Medications administered as ordered. Patient complained of pain consistently through the night, PRN medications administered as ordered. Patient ambulatory to bathroom with stand by assist, safety maintained. Encouraged to call with needs or concerns. Will continue to monitor.

## 2020-06-14 NOTE — PROGRESS NOTES
"Patient seen yesterday as a consult for abdominal pain (please see my consult) and states has had continued pain. Upon entering room she was in the bathroom and appeared to walk OK to the bed. Significant other in the room as well and both very upset as they feel she may have an ectopic pregnancy. I explained very unlikely but I would be happy to order a HCG to rule that out yet again even though unlikely as initial pregnancy test negative, Ronald Pollard looked in abdomen/pelvis and has had a tubal (can have tubal failure). She is very focused on why she only had a 2 day period.Explained that stress and having PID can change menses length. Also notified patient that some of her pain maybe from her back as her back has "many problem as noted in her chart from previous CT scan". S.O. says "that makes sense". She says my back has never been this bad.          ROS:  GENERAL: Denies weight gain or weight loss. Feeling well overall.   SKIN: Denies rash or lesions.   HEAD: Denies head injury or headache.   CHEST: Denies chest pain or shortness of breath.   CARDIOVASCULAR: Denies palpitations or left sided chest pain.   ABDOMEN: No constipation, diarrhea, nausea, vomiting or rectal bleeding.   URINARY: No frequency, dysuria, hematuria, or burning on urination.  REPRODUCTIVE: See HPI.     Past Medical History:   Diagnosis Date    Chronic bronchitis     Depression     Hepatitis C      Past Surgical History:   Procedure Laterality Date    DIAGNOSTIC LAPAROSCOPY N/A 6/11/2020    Procedure: LAPAROSCOPY, DIAGNOSTIC;  Surgeon: Khari Cardenas Jr., MD;  Location: [facility] OR;  Service: General;  Laterality: N/A;    HERNIA REPAIR      LAPAROSCOPIC LYSIS OF ADHESIONS N/A 6/11/2020    Procedure: LYSIS, ADHESIONS, LAPAROSCOPIC;  Surgeon: Khari Cardenas Jr., MD;  Location: [facility] OR;  Service: General;  Laterality: N/A;    REMOVAL OF MESH N/A 6/11/2020    Procedure: REMOVAL, MESH;  Surgeon: Khari Cardenas Jr., MD;  " "Location: [facility] OR;  Service: General;  Laterality: N/A;    TUBAL LIGATION       History reviewed. No pertinent family history.  Naproxen, Tessalon [benzonatate], and Ciprofloxacin       PE:   /84 (BP Location: Right arm, Patient Position: Lying)   Pulse 72   Temp 96.2 °F (35.7 °C) (Oral)   Resp 18   Ht 5' 5" (1.651 m)   Wt 81 kg (178 lb 9.2 oz)   LMP 06/06/2020   SpO2 99%   Breastfeeding No   BMI 29.72 kg/m²   APPEARANCE: Well nourished, well developed, in no acute distress.  ABDOMEN:  Soft,  RLQ tenderness with voluntary guarding and points still to right side in a vague manner, well healed port sites  PELVIC: decreased CMT compared to yesterday    Assessment/Plan:  RLQ abdominal pain  Possible musculoskeletal component  GI involved  Concerned about ectopic pregnancy-- will order HCG  PID-- seems to be improving clinically.     "

## 2020-06-14 NOTE — PLAN OF CARE
Pt is AAOx4. Meds given per MAR, c/o of 10/10 pain w/ little to no relief with prn pain meds. GI and Gyno consulted. PCA pump ordered. No c/o of sob, n/v. Pt shows no s/o distress. Up with stand-by assist. Bed alarm on, call light w/in reach, pt instructed to call staff for assistance.

## 2020-06-15 LAB
ANION GAP SERPL CALC-SCNC: 6 MMOL/L (ref 8–16)
BASOPHILS # BLD AUTO: 0.02 K/UL (ref 0–0.2)
BASOPHILS NFR BLD: 0.5 % (ref 0–1.9)
BUN SERPL-MCNC: 10 MG/DL (ref 6–20)
CALCIUM SERPL-MCNC: 9.2 MG/DL (ref 8.7–10.5)
CHLORIDE SERPL-SCNC: 101 MMOL/L (ref 95–110)
CO2 SERPL-SCNC: 31 MMOL/L (ref 23–29)
CREAT SERPL-MCNC: 0.8 MG/DL (ref 0.5–1.4)
DIFFERENTIAL METHOD: ABNORMAL
EOSINOPHIL # BLD AUTO: 0.3 K/UL (ref 0–0.5)
EOSINOPHIL NFR BLD: 7.7 % (ref 0–8)
ERYTHROCYTE [DISTWIDTH] IN BLOOD BY AUTOMATED COUNT: 14.1 % (ref 11.5–14.5)
EST. GFR  (AFRICAN AMERICAN): >60 ML/MIN/1.73 M^2
EST. GFR  (NON AFRICAN AMERICAN): >60 ML/MIN/1.73 M^2
GENTAMICIN TROUGH SERPL-MCNC: <0.5 UG/ML (ref 0–2)
GLUCOSE SERPL-MCNC: 105 MG/DL (ref 70–110)
HCT VFR BLD AUTO: 35.4 % (ref 37–48.5)
HGB BLD-MCNC: 11.3 G/DL (ref 12–16)
IMM GRANULOCYTES # BLD AUTO: 0 K/UL (ref 0–0.04)
IMM GRANULOCYTES NFR BLD AUTO: 0 % (ref 0–0.5)
LYMPHOCYTES # BLD AUTO: 1.5 K/UL (ref 1–4.8)
LYMPHOCYTES NFR BLD: 41.5 % (ref 18–48)
MCH RBC QN AUTO: 31 PG (ref 27–31)
MCHC RBC AUTO-ENTMCNC: 31.9 G/DL (ref 32–36)
MCV RBC AUTO: 97 FL (ref 82–98)
MONOCYTES # BLD AUTO: 0.5 K/UL (ref 0.3–1)
MONOCYTES NFR BLD: 13.2 % (ref 4–15)
NEUTROPHILS # BLD AUTO: 1.4 K/UL (ref 1.8–7.7)
NEUTROPHILS NFR BLD: 37.1 % (ref 38–73)
NRBC BLD-RTO: 0 /100 WBC
PLATELET # BLD AUTO: 265 K/UL (ref 150–350)
PMV BLD AUTO: 9.8 FL (ref 9.2–12.9)
POTASSIUM SERPL-SCNC: 4.6 MMOL/L (ref 3.5–5.1)
RBC # BLD AUTO: 3.65 M/UL (ref 4–5.4)
SODIUM SERPL-SCNC: 138 MMOL/L (ref 136–145)
WBC # BLD AUTO: 3.64 K/UL (ref 3.9–12.7)

## 2020-06-15 PROCEDURE — 80170 ASSAY OF GENTAMICIN: CPT

## 2020-06-15 PROCEDURE — 99231 SBSQ HOSP IP/OBS SF/LOW 25: CPT | Mod: ,,, | Performed by: OBSTETRICS & GYNECOLOGY

## 2020-06-15 PROCEDURE — 11000001 HC ACUTE MED/SURG PRIVATE ROOM

## 2020-06-15 PROCEDURE — 99900035 HC TECH TIME PER 15 MIN (STAT)

## 2020-06-15 PROCEDURE — 94770 HC EXHALED C02 TEST: CPT

## 2020-06-15 PROCEDURE — 36415 COLL VENOUS BLD VENIPUNCTURE: CPT

## 2020-06-15 PROCEDURE — 63600175 PHARM REV CODE 636 W HCPCS: Performed by: SURGERY

## 2020-06-15 PROCEDURE — 25000003 PHARM REV CODE 250: Performed by: OBSTETRICS & GYNECOLOGY

## 2020-06-15 PROCEDURE — 99231 PR SUBSEQUENT HOSPITAL CARE,LEVL I: ICD-10-PCS | Mod: ,,, | Performed by: OBSTETRICS & GYNECOLOGY

## 2020-06-15 PROCEDURE — 25000003 PHARM REV CODE 250: Performed by: SURGERY

## 2020-06-15 PROCEDURE — 80048 BASIC METABOLIC PNL TOTAL CA: CPT

## 2020-06-15 PROCEDURE — 85025 COMPLETE CBC W/AUTO DIFF WBC: CPT

## 2020-06-15 RX ORDER — POLYETHYLENE GLYCOL 3350 17 G/17G
17 POWDER, FOR SOLUTION ORAL 2 TIMES DAILY
Status: DISCONTINUED | OUTPATIENT
Start: 2020-06-15 | End: 2020-06-16 | Stop reason: HOSPADM

## 2020-06-15 RX ADMIN — CLINDAMYCIN IN 5 PERCENT DEXTROSE 900 MG: 18 INJECTION, SOLUTION INTRAVENOUS at 11:06

## 2020-06-15 RX ADMIN — KETOROLAC TROMETHAMINE 10 MG: 10 TABLET, FILM COATED ORAL at 02:06

## 2020-06-15 RX ADMIN — ALPRAZOLAM 0.5 MG: 0.25 TABLET ORAL at 02:06

## 2020-06-15 RX ADMIN — POLYETHYLENE GLYCOL 3350 17 G: 17 POWDER, FOR SOLUTION ORAL at 08:06

## 2020-06-15 RX ADMIN — KETOROLAC TROMETHAMINE 10 MG: 10 TABLET, FILM COATED ORAL at 09:06

## 2020-06-15 RX ADMIN — CLONAZEPAM 0.5 MG: 0.5 TABLET ORAL at 08:06

## 2020-06-15 RX ADMIN — BUPROPION HYDROCHLORIDE 100 MG: 100 TABLET, FILM COATED, EXTENDED RELEASE ORAL at 09:06

## 2020-06-15 RX ADMIN — Medication: at 06:06

## 2020-06-15 RX ADMIN — GABAPENTIN 600 MG: 300 CAPSULE ORAL at 08:06

## 2020-06-15 RX ADMIN — BUPROPION HYDROCHLORIDE 100 MG: 100 TABLET, FILM COATED, EXTENDED RELEASE ORAL at 08:06

## 2020-06-15 RX ADMIN — KETOROLAC TROMETHAMINE 10 MG: 10 TABLET, FILM COATED ORAL at 05:06

## 2020-06-15 RX ADMIN — Medication: at 05:06

## 2020-06-15 RX ADMIN — CLONAZEPAM 0.5 MG: 0.5 TABLET ORAL at 09:06

## 2020-06-15 RX ADMIN — ALPRAZOLAM 0.5 MG: 0.25 TABLET ORAL at 09:06

## 2020-06-15 RX ADMIN — CLINDAMYCIN IN 5 PERCENT DEXTROSE 900 MG: 18 INJECTION, SOLUTION INTRAVENOUS at 05:06

## 2020-06-15 RX ADMIN — ALPRAZOLAM 0.5 MG: 0.25 TABLET ORAL at 08:06

## 2020-06-15 RX ADMIN — Medication: at 11:06

## 2020-06-15 RX ADMIN — ACETAMINOPHEN 1000 MG: 500 TABLET ORAL at 05:06

## 2020-06-15 RX ADMIN — CLINDAMYCIN IN 5 PERCENT DEXTROSE 900 MG: 18 INJECTION, SOLUTION INTRAVENOUS at 09:06

## 2020-06-15 RX ADMIN — GENTAMICIN SULFATE 285.2 MG: 40 INJECTION, SOLUTION INTRAMUSCULAR; INTRAVENOUS at 04:06

## 2020-06-15 RX ADMIN — LIDOCAINE 1 PATCH: 50 PATCH TOPICAL at 08:06

## 2020-06-15 RX ADMIN — DOCUSATE SODIUM 100 MG: 100 CAPSULE, LIQUID FILLED ORAL at 08:06

## 2020-06-15 RX ADMIN — GABAPENTIN 600 MG: 300 CAPSULE ORAL at 09:06

## 2020-06-15 RX ADMIN — GABAPENTIN 600 MG: 300 CAPSULE ORAL at 02:06

## 2020-06-15 RX ADMIN — POLYETHYLENE GLYCOL 3350 17 G: 17 POWDER, FOR SOLUTION ORAL at 09:06

## 2020-06-15 RX ADMIN — DOCUSATE SODIUM 100 MG: 100 CAPSULE, LIQUID FILLED ORAL at 09:06

## 2020-06-15 NOTE — PROGRESS NOTES
[facility]  General Surgery  Progress Note    Subjective:     History of Present Illness:  Cary King is a 29 y.o. female who presented with severe right lower quadrant abdominal and flank pain.  Labs and UA unremarkable.  CT scan and pelvic ultrasound unremarkable. Due to persistent severe pain the patient underwent diagnostic laparoscopy.  No obvious etiology of her pain was found.  Appendix appeared normal as well as the ileum.  There was some mild inflammation and numerous interloop adhesions of the mid small bowel.  She had significant omental adhesions to mesh from a previous umbilical hernia repair, the mesh was found to be folded and not adhered to the anterior abdominal wall there for the majority the mesh was removed after lysis of adhesions was performed.  The patient's ovaries appeared grossly normal and she is status post tubal ligation.  There is no obvious abscess or torsion.  The uterus was somewhat enlarged and possibly mildly inflamed but there is no purulence or turbid fluid in the pelvic cul-de-sac.  The gallbladder appeared grossly normal.       Post-Op Info:  Procedure(s) (LRB):  LAPAROSCOPY, DIAGNOSTIC (N/A)  LYSIS, ADHESIONS, LAPAROSCOPIC (N/A)  REMOVAL, MESH (N/A)   4 Days Post-Op     Interval History: No acute events overnight.  AF, VSS, labs WNL. Pain adequately controlled on multimodals and addition of PCA yesterday. Patient tolerating regular diet, no nausea or vomiting. Passing gas. Reports she has not had a bowel movement in a week. OBGYN saw the patient and gave abx recommendations for treatment of presumed PID.    Medications:  Continuous Infusions:   hydromorphone in 0.9 % NaCl 6 mg/30 ml       Scheduled Meds:   acetaminophen  1,000 mg Oral Q8H    ALPRAZolam  0.5 mg Oral TID    buPROPion  100 mg Oral BID    clindamycin (CLEOCIN) IVPB  900 mg Intravenous Q8H    clonazePAM  0.5 mg Oral BID    cyclobenzaprine  5 mg Oral TID    docusate sodium  100 mg Oral  BID    gabapentin  600 mg Oral TID    gentamicin  5 mg/kg (Ideal) Intravenous Q24H    ketorolac  10 mg Oral Q8H    lidocaine  1 patch Transdermal Daily    polyethylene glycol  17 g Oral Daily     PRN Meds:melatonin, naloxone, ondansetron, ondansetron, sodium chloride 0.9%     Review of patient's allergies indicates:   Allergen Reactions    Naproxen Hives    Tessalon [benzonatate] Swelling    Ciprofloxacin Rash     Objective:     Vital Signs (Most Recent):  Temp: 97.6 °F (36.4 °C) (06/15/20 1226)  Pulse: 77 (06/15/20 1226)  Resp: 17 (06/15/20 0757)  BP: 100/60 (06/15/20 1226)  SpO2: 96 % (06/15/20 1226) Vital Signs (24h Range):  Temp:  [97.6 °F (36.4 °C)-98 °F (36.7 °C)] 97.6 °F (36.4 °C)  Pulse:  [58-77] 77  Resp:  [16-18] 17  SpO2:  [93 %-97 %] 96 %  BP: (100-129)/(60-77) 100/60     Weight: 78.3 kg (172 lb 9.9 oz)  Body mass index is 28.73 kg/m².    Intake/Output - Last 3 Shifts       06/13 0700 - 06/14 0659 06/14 0700 - 06/15 0659 06/15 0700 - 06/16 0659    P.O.  325     I.V. (mL/kg)       IV Piggyback 100 100     Total Intake(mL/kg) 100 (1.2) 425 (5.4)     Urine (mL/kg/hr) 2800 (1.4) 2050 (1.1)     Stool 0      Total Output 2800 2050     Net -2700 -1625            Stool Occurrence 0 x  0 x          Physical Exam  Constitutional:       Appearance: She is well-developed.   Neck:      Vascular: No JVD.      Trachea: No tracheal deviation.   Cardiovascular:      Rate and Rhythm: Normal rate and regular rhythm.   Pulmonary:      Effort: No respiratory distress.      Breath sounds: Normal breath sounds.   Abdominal:      General: There is no distension.      Palpations: Abdomen is soft. There is no mass.      Tenderness: There is no abdominal tenderness. There is no guarding or rebound.      Comments: Lidocaine patch to right flank   Musculoskeletal:         General: No edema.   Skin:     General: Skin is warm and dry.   Neurological:      Mental Status: She is alert and oriented to person, place, and time.          Significant Labs:  CBC:   Recent Labs   Lab 06/15/20  0722   WBC 3.64*   RBC 3.65*   HGB 11.3*   HCT 35.4*      MCV 97   MCH 31.0   MCHC 31.9*     BMP:   Recent Labs   Lab 06/15/20  0722         K 4.6      CO2 31*   BUN 10   CREATININE 0.8   CALCIUM 9.2       Significant Diagnostics:  I have reviewed all pertinent imaging results/findings within the past 24 hours.    Assessment/Plan:     * RLQ abdominal pain  29 y.o. female with severe right lower quadrant/flank pain of unknown etiology. Now 4 Days Post-Op from negative diagnostic laparoscopy on 6/11/20.      - etiology of pain remains unclear, no obvious source from diagnostic laparoscopy  - UA and urine cxs NGTD.   - remains afebrile with normal white blood cell count and not elevated procalcitonin  - retroperitoneal ultrasound failed to show any nephrolithiasis or hydronephrosis or other etiology for pain  - possible etiologies include colitis/enteritis (which patient has a vague history of being diagnosed with colitis in the past), PID (though no abnormal fluid noted in the pelvis), pelvic thrombophlebitis, UTI, nephrolithiasis  - GYN and GI consulted  - Will discharge on Zithromax 500mg 2 tablets to start then one tablet daily x 7 days and Flagyl 500mg one tablet BID x 7 days  - continue gentamycin and clindamycin for now  - If symptoms fail to improve with antibiotic therapy for PID GI may consider endoscopy for further evaluation  - if no obvious infectious etiologies identified we may consider a trial of steroids  - questionable drug-seeking behavior as the patient has repeatedly refused p.o. narcotic and nonnarcotic pain medication and is repetitively asking for only IV pain medication.  In addition at night the patient has reported severe 10/10 pain and requested pain medication however upon entering the room the nurse noted the patient was asleep.   - for now will continue with the current pain regimen. Dilaudid PCA. We  will continue her on schedule Tylenol and Toradol.  Flexeril 5 mg t.i.d. has been added and her gabapentin dose has been increased to 600 mg t.i.d..  Lidocaine patch has also been ordered.            Mallory Hector MD  General Surgery  [facility]

## 2020-06-15 NOTE — SUBJECTIVE & OBJECTIVE
Interval History: No acute events overnight.  AF, VSS, labs WNL. Pain adequately controlled on multimodals and addition of PCA yesterday. Patient tolerating regular diet, no nausea or vomiting. Passing gas. Reports she has not had a bowel movement in a week. OBGYN saw the patient and gave abx recommendations for treatment of presumed PID.    Medications:  Continuous Infusions:   hydromorphone in 0.9 % NaCl 6 mg/30 ml       Scheduled Meds:   acetaminophen  1,000 mg Oral Q8H    ALPRAZolam  0.5 mg Oral TID    buPROPion  100 mg Oral BID    clindamycin (CLEOCIN) IVPB  900 mg Intravenous Q8H    clonazePAM  0.5 mg Oral BID    cyclobenzaprine  5 mg Oral TID    docusate sodium  100 mg Oral BID    gabapentin  600 mg Oral TID    gentamicin  5 mg/kg (Ideal) Intravenous Q24H    ketorolac  10 mg Oral Q8H    lidocaine  1 patch Transdermal Daily    polyethylene glycol  17 g Oral Daily     PRN Meds:melatonin, naloxone, ondansetron, ondansetron, sodium chloride 0.9%     Review of patient's allergies indicates:   Allergen Reactions    Naproxen Hives    Tessalon [benzonatate] Swelling    Ciprofloxacin Rash     Objective:     Vital Signs (Most Recent):  Temp: 97.6 °F (36.4 °C) (06/15/20 1226)  Pulse: 77 (06/15/20 1226)  Resp: 17 (06/15/20 0757)  BP: 100/60 (06/15/20 1226)  SpO2: 96 % (06/15/20 1226) Vital Signs (24h Range):  Temp:  [97.6 °F (36.4 °C)-98 °F (36.7 °C)] 97.6 °F (36.4 °C)  Pulse:  [58-77] 77  Resp:  [16-18] 17  SpO2:  [93 %-97 %] 96 %  BP: (100-129)/(60-77) 100/60     Weight: 78.3 kg (172 lb 9.9 oz)  Body mass index is 28.73 kg/m².    Intake/Output - Last 3 Shifts       06/13 0700 - 06/14 0659 06/14 0700 - 06/15 0659 06/15 0700 - 06/16 0659    P.O.  325     I.V. (mL/kg)       IV Piggyback 100 100     Total Intake(mL/kg) 100 (1.2) 425 (5.4)     Urine (mL/kg/hr) 2800 (1.4) 2050 (1.1)     Stool 0      Total Output 2800 2050     Net -2700 -1625            Stool Occurrence 0 x  0 x          Physical  Exam  Constitutional:       Appearance: She is well-developed.   Neck:      Vascular: No JVD.      Trachea: No tracheal deviation.   Cardiovascular:      Rate and Rhythm: Normal rate and regular rhythm.   Pulmonary:      Effort: No respiratory distress.      Breath sounds: Normal breath sounds.   Abdominal:      General: There is no distension.      Palpations: Abdomen is soft. There is no mass.      Tenderness: There is no abdominal tenderness. There is no guarding or rebound.      Comments: Lidocaine patch to right flank   Musculoskeletal:         General: No edema.   Skin:     General: Skin is warm and dry.   Neurological:      Mental Status: She is alert and oriented to person, place, and time.         Significant Labs:  CBC:   Recent Labs   Lab 06/15/20  0722   WBC 3.64*   RBC 3.65*   HGB 11.3*   HCT 35.4*      MCV 97   MCH 31.0   MCHC 31.9*     BMP:   Recent Labs   Lab 06/15/20  0722         K 4.6      CO2 31*   BUN 10   CREATININE 0.8   CALCIUM 9.2       Significant Diagnostics:  I have reviewed all pertinent imaging results/findings within the past 24 hours.

## 2020-06-15 NOTE — PLAN OF CARE
Patient resting in bed, AAOx4. Medications administered as ordered. Patient with complaints of pain overnight, using PCA frequently with relief. Asleep on and off through the night. Encouraged to call with needs or concerns. Will continue to monitor.

## 2020-06-15 NOTE — ASSESSMENT & PLAN NOTE
29 y.o. female with severe right lower quadrant/flank pain of unknown etiology. Now 4 Days Post-Op from negative diagnostic laparoscopy on 6/11/20.      - etiology of pain remains unclear, no obvious source from diagnostic laparoscopy  - UA and urine cxs NGTD.   - remains afebrile with normal white blood cell count and not elevated procalcitonin  - retroperitoneal ultrasound failed to show any nephrolithiasis or hydronephrosis or other etiology for pain  - possible etiologies include colitis/enteritis (which patient has a vague history of being diagnosed with colitis in the past), PID (though no abnormal fluid noted in the pelvis), pelvic thrombophlebitis, UTI, nephrolithiasis  - GYN and GI consulted  - Will discharge on Zithromax 500mg 2 tablets to start then one tablet daily x 7 days and Flagyl 500mg one tablet BID x 7 days  - continue gentamycin and clindamycin for now  - If symptoms fail to improve with antibiotic therapy for PID GI may consider endoscopy for further evaluation  - if no obvious infectious etiologies identified we may consider a trial of steroids  - questionable drug-seeking behavior as the patient has repeatedly refused p.o. narcotic and nonnarcotic pain medication and is repetitively asking for only IV pain medication.  In addition at night the patient has reported severe 10/10 pain and requested pain medication however upon entering the room the nurse noted the patient was asleep.   - for now will continue with the current pain regimen. Dilaudid PCA. We will continue her on schedule Tylenol and Toradol.  Flexeril 5 mg t.i.d. has been added and her gabapentin dose has been increased to 600 mg t.i.d..  Lidocaine patch has also been ordered.

## 2020-06-15 NOTE — HPI
Cary King is a 29 y.o. female who presented with severe right lower quadrant abdominal and flank pain.  Labs and UA unremarkable.  CT scan and pelvic ultrasound unremarkable. Due to persistent severe pain the patient underwent diagnostic laparoscopy.  No obvious etiology of her pain was found.  Appendix appeared normal as well as the ileum.  There was some mild inflammation and numerous interloop adhesions of the mid small bowel.  She had significant omental adhesions to mesh from a previous umbilical hernia repair, the mesh was found to be folded and not adhered to the anterior abdominal wall there for the majority the mesh was removed after lysis of adhesions was performed.  The patient's ovaries appeared grossly normal and she is status post tubal ligation.  There is no obvious abscess or torsion.  The uterus was somewhat enlarged and possibly mildly inflamed but there is no purulence or turbid fluid in the pelvic cul-de-sac.  The gallbladder appeared grossly normal.

## 2020-06-15 NOTE — PROGRESS NOTES
Pharmacokinetic Follow Up: Gentamicin    Assessment of levels:   Trough level <0.5ug/ml  Regimen Plan:   Will monitor troughs every 3 days x 2, then weekly thereafter unless acute change in renal function or the trough goal exceeds the goal concentration; Next trough concentration due on 6/18/2020 at 1500.    Drug levels (last 3 results):  No results for input(s): AMIKACINPEAK, AMIKACINTROU, AMIKACINRAND, AMIKACIN in the last 72 hours.    No results for input(s): GENTAMICIN, GENTPEAK, GENTTROUGH, GENT10, GENT12, GENT8, GENTRANDOM in the last 72 hours.    No results for input(s): TOBRA8, TOBRA10, TOBRA12, TOBRARND, TOBRAMYCIN, TOBRAPEAK, TOBRATROUGH, TOBRAMYCINPE, TOBRAMYCINRA, TOBRAMYCINTR in the last 72 hours.    Pharmacy will continue to monitor.    Please contact pharmacy at extension [phone] with any questions regarding this assessment.    Thank you for the consult,   Damaris Durán      Patient brief summary:  Cary King is a 29 y.o. female initiated on aminoglycoside therapy for treatment of  PID    Drug Allergies:   Review of patient's allergies indicates:   Allergen Reactions    Naproxen Hives    Tessalon [benzonatate] Swelling    Ciprofloxacin Rash       Actual Body Weight:   78.3 kg    Adjust Body Weight:   65.5 kg    Ideal Body Weight:  57 kg    Renal Function:   Estimated Creatinine Clearance: 106.3 mL/min (based on SCr of 0.8 mg/dL).,     Dialysis Method (if applicable):  N/A    CBC (last 72 hours):  Recent Labs   Lab Result Units 06/13/20  0436 06/14/20  0524 06/15/20  0722   WBC K/uL 5.68 4.83 3.64*   Hemoglobin g/dL 10.5* 10.7* 11.3*   Hematocrit % 32.8* 33.2* 35.4*   Platelets K/uL 248 248 265   Gran% % 46.8 44.1 37.1*   Lymph% % 35.7 37.5 41.5   Mono% % 10.9 11.6 13.2   Eosinophil% % 6.2 6.2 7.7   Basophil% % 0.2 0.4 0.5   Differential Method  Automated Automated Automated       Metabolic Panel (last 72 hours):  Recent Labs   Lab Result Units 06/13/20  0436 06/14/20  0524 06/15/20  0722   Sodium  mmol/L 138 137 138   Potassium mmol/L 4.4 4.5 4.6   Chloride mmol/L 104 103 101   CO2 mmol/L 27 29 31*   Glucose mg/dL 92 101 105   BUN, Bld mg/dL 7 5* 10   Creatinine mg/dL 0.8 0.8 0.8   Albumin g/dL 2.8*  --   --    Total Bilirubin mg/dL 0.2  --   --    Alkaline Phosphatase U/L 71  --   --    AST U/L 59*  --   --    ALT U/L 58*  --   --        Aminoglycoside Administrations:  aminoglycosides given in last 96 hours                     gentamicin (GARAMYCIN) 285.2 mg in sodium chloride 0.9% 100 mL IVPB (mg) 285.2 mg New Bag 06/15/20 1600    gentamicin (GARAMYCIN) 285.2 mg in sodium chloride 0.9% 100 mL IVPB (mg) 285.2 mg New Bag 06/14/20 1601    gentamicin (GARAMYCIN) 285.2 mg in sodium chloride 0.9% 100 mL IVPB (mg) 285.2 mg New Bag 06/13/20 1315                    Microbiologic Results:  Microbiology Results (last 7 days)       Procedure Component Value Units Date/Time    Urine Culture High Risk [[id number]] Collected: 06/12/20 1224    Order Status: Completed Specimen: Urine, Clean Catch Updated: 06/13/20 2036     Urine Culture, Routine No growth    Narrative:      Indicated criteria for high risk culture:->Other  Other (specify):->severe right flank pain, negative  diagnostic laparoscopy, h/o UTI    C. trachomatis/N. gonorrhoeae by AMP DNA [facility]; Cervix [[id number]] Collected: 06/09/20 1418    Order Status: Completed Specimen: Genital Updated: 06/10/20 2007     Chlamydia, Amplified DNA Not Detected     N gonorrhoeae, amplified DNA Not Detected    Narrative:      Sources by Resulting Lab:->[facility]

## 2020-06-15 NOTE — PROGRESS NOTES
"      HPI:  Patient seen yesterday as a consult for abdominal pain (please see my consult) and states has had continued pain. Upon entering room she was in the bathroom and appeared to walk OK to the bed. Significant other in the room as well and both very upset as they feel she may have an ectopic pregnancy. I explained very unlikely but I would be happy to order a HCG to rule that out yet again even though unlikely as initial pregnancy test negative, Ronald Pollard looked in abdomen/pelvis and has had a tubal (can have tubal failure). She is very focused on why she only had a 2 day period.Explained that stress and having PID can change menses length. Also notified patient that some of her pain maybe from her back as her back has "many problem as noted in her chart from previous CT scan". S.O. says "that makes sense". She says my back has never been this bad.       Interval HPI:  Patient continues to complain of pain however when I entered the room she was in bed talking on the phone before I could find the nurse. She seemed calm. When I came back in with the nurse she started to moan and say she was in pain.    PELVIC: Decreased CMT from yesterday and able to palpate RLQ some tenderness on the left however that is where the incision in the left lower quadrant is located.      Assessment/Plan:  RLQ abdominal pain  Possible musculoskeletal component  GI involved  PID-- Continues to improve. I will sign off at this point however when discharged patient will need to go home with Zithromax 500mg 2 tablets to start then one tablet daily x 7 days and Flagyl 500mg one tablet BID x 7 days    Consult again if needed     Shirin Calhoun MD     "

## 2020-06-16 VITALS
DIASTOLIC BLOOD PRESSURE: 76 MMHG | HEIGHT: 65 IN | TEMPERATURE: 98 F | SYSTOLIC BLOOD PRESSURE: 116 MMHG | WEIGHT: 172.63 LBS | RESPIRATION RATE: 20 BRPM | BODY MASS INDEX: 28.76 KG/M2 | HEART RATE: 73 BPM | OXYGEN SATURATION: 99 %

## 2020-06-16 LAB
ANION GAP SERPL CALC-SCNC: 4 MMOL/L (ref 8–16)
BASOPHILS # BLD AUTO: 0.03 K/UL (ref 0–0.2)
BASOPHILS NFR BLD: 0.7 % (ref 0–1.9)
BUN SERPL-MCNC: 14 MG/DL (ref 6–20)
CALCIUM SERPL-MCNC: 8.6 MG/DL (ref 8.7–10.5)
CHLORIDE SERPL-SCNC: 104 MMOL/L (ref 95–110)
CO2 SERPL-SCNC: 30 MMOL/L (ref 23–29)
CREAT SERPL-MCNC: 0.7 MG/DL (ref 0.5–1.4)
DIFFERENTIAL METHOD: ABNORMAL
EOSINOPHIL # BLD AUTO: 0.3 K/UL (ref 0–0.5)
EOSINOPHIL NFR BLD: 6.6 % (ref 0–8)
ERYTHROCYTE [DISTWIDTH] IN BLOOD BY AUTOMATED COUNT: 14.5 % (ref 11.5–14.5)
EST. GFR  (AFRICAN AMERICAN): >60 ML/MIN/1.73 M^2
EST. GFR  (NON AFRICAN AMERICAN): >60 ML/MIN/1.73 M^2
FINAL PATHOLOGIC DIAGNOSIS: NORMAL
GLUCOSE SERPL-MCNC: 95 MG/DL (ref 70–110)
GROSS: NORMAL
HCT VFR BLD AUTO: 32.4 % (ref 37–48.5)
HGB BLD-MCNC: 10.3 G/DL (ref 12–16)
IMM GRANULOCYTES # BLD AUTO: 0 K/UL (ref 0–0.04)
IMM GRANULOCYTES NFR BLD AUTO: 0 % (ref 0–0.5)
LYMPHOCYTES # BLD AUTO: 1.6 K/UL (ref 1–4.8)
LYMPHOCYTES NFR BLD: 34.8 % (ref 18–48)
MCH RBC QN AUTO: 30.5 PG (ref 27–31)
MCHC RBC AUTO-ENTMCNC: 31.8 G/DL (ref 32–36)
MCV RBC AUTO: 96 FL (ref 82–98)
MONOCYTES # BLD AUTO: 0.7 K/UL (ref 0.3–1)
MONOCYTES NFR BLD: 15.6 % (ref 4–15)
NEUTROPHILS # BLD AUTO: 1.9 K/UL (ref 1.8–7.7)
NEUTROPHILS NFR BLD: 42.3 % (ref 38–73)
NRBC BLD-RTO: 0 /100 WBC
PLATELET # BLD AUTO: 240 K/UL (ref 150–350)
PMV BLD AUTO: 9.6 FL (ref 9.2–12.9)
POTASSIUM SERPL-SCNC: 4.4 MMOL/L (ref 3.5–5.1)
RBC # BLD AUTO: 3.38 M/UL (ref 4–5.4)
SODIUM SERPL-SCNC: 138 MMOL/L (ref 136–145)
WBC # BLD AUTO: 4.54 K/UL (ref 3.9–12.7)

## 2020-06-16 PROCEDURE — 94761 N-INVAS EAR/PLS OXIMETRY MLT: CPT

## 2020-06-16 PROCEDURE — 25000003 PHARM REV CODE 250: Performed by: SURGERY

## 2020-06-16 PROCEDURE — 99231 SBSQ HOSP IP/OBS SF/LOW 25: CPT | Mod: ,,, | Performed by: INTERNAL MEDICINE

## 2020-06-16 PROCEDURE — 25000003 PHARM REV CODE 250: Performed by: OBSTETRICS & GYNECOLOGY

## 2020-06-16 PROCEDURE — 80048 BASIC METABOLIC PNL TOTAL CA: CPT

## 2020-06-16 PROCEDURE — 63600175 PHARM REV CODE 636 W HCPCS: Performed by: SURGERY

## 2020-06-16 PROCEDURE — 99231 PR SUBSEQUENT HOSPITAL CARE,LEVL I: ICD-10-PCS | Mod: ,,, | Performed by: INTERNAL MEDICINE

## 2020-06-16 PROCEDURE — 85025 COMPLETE CBC W/AUTO DIFF WBC: CPT

## 2020-06-16 PROCEDURE — 94770 HC EXHALED C02 TEST: CPT

## 2020-06-16 PROCEDURE — 25000003 PHARM REV CODE 250: Performed by: STUDENT IN AN ORGANIZED HEALTH CARE EDUCATION/TRAINING PROGRAM

## 2020-06-16 PROCEDURE — 63700000 PHARM REV CODE 250 ALT 637 W/O HCPCS: Performed by: STUDENT IN AN ORGANIZED HEALTH CARE EDUCATION/TRAINING PROGRAM

## 2020-06-16 PROCEDURE — 36415 COLL VENOUS BLD VENIPUNCTURE: CPT

## 2020-06-16 PROCEDURE — 99900035 HC TECH TIME PER 15 MIN (STAT)

## 2020-06-16 RX ORDER — METRONIDAZOLE 500 MG/1
500 TABLET ORAL EVERY 12 HOURS
Status: DISCONTINUED | OUTPATIENT
Start: 2020-06-16 | End: 2020-06-16 | Stop reason: HOSPADM

## 2020-06-16 RX ORDER — METRONIDAZOLE 500 MG/1
500 TABLET ORAL EVERY 12 HOURS
Qty: 12 TABLET | Refills: 0 | Status: SHIPPED | OUTPATIENT
Start: 2020-06-16 | End: 2020-06-22 | Stop reason: SDUPTHER

## 2020-06-16 RX ORDER — OXYCODONE HYDROCHLORIDE 5 MG/1
10 TABLET ORAL EVERY 4 HOURS PRN
Status: DISCONTINUED | OUTPATIENT
Start: 2020-06-16 | End: 2020-06-16 | Stop reason: HOSPADM

## 2020-06-16 RX ORDER — AZITHROMYCIN 250 MG/1
1000 TABLET, FILM COATED ORAL ONCE
Status: COMPLETED | OUTPATIENT
Start: 2020-06-16 | End: 2020-06-16

## 2020-06-16 RX ORDER — OXYCODONE HYDROCHLORIDE 5 MG/1
5 TABLET ORAL EVERY 4 HOURS PRN
Status: DISCONTINUED | OUTPATIENT
Start: 2020-06-16 | End: 2020-06-16 | Stop reason: HOSPADM

## 2020-06-16 RX ORDER — DOCUSATE SODIUM 100 MG/1
100 CAPSULE, LIQUID FILLED ORAL 2 TIMES DAILY
Qty: 60 CAPSULE | Refills: 0 | Status: SHIPPED | OUTPATIENT
Start: 2020-06-16 | End: 2020-07-16

## 2020-06-16 RX ORDER — AZITHROMYCIN 250 MG/1
500 TABLET, FILM COATED ORAL DAILY
Status: DISCONTINUED | OUTPATIENT
Start: 2020-06-17 | End: 2020-06-16 | Stop reason: HOSPADM

## 2020-06-16 RX ORDER — CYCLOBENZAPRINE HCL 5 MG
5 TABLET ORAL 3 TIMES DAILY
Qty: 30 TABLET | Refills: 0 | Status: SHIPPED | OUTPATIENT
Start: 2020-06-16 | End: 2020-06-22

## 2020-06-16 RX ORDER — AZITHROMYCIN 500 MG/1
500 TABLET, FILM COATED ORAL DAILY
Qty: 6 TABLET | Refills: 0 | Status: SHIPPED | OUTPATIENT
Start: 2020-06-17 | End: 2020-06-22

## 2020-06-16 RX ORDER — OXYCODONE HYDROCHLORIDE 5 MG/1
5 TABLET ORAL EVERY 4 HOURS PRN
Qty: 30 TABLET | Refills: 0 | Status: SHIPPED | OUTPATIENT
Start: 2020-06-16 | End: 2020-06-22

## 2020-06-16 RX ORDER — GABAPENTIN 300 MG/1
600 CAPSULE ORAL 3 TIMES DAILY
Qty: 180 CAPSULE | Refills: 0 | Status: SHIPPED | OUTPATIENT
Start: 2020-06-16 | End: 2020-06-22

## 2020-06-16 RX ORDER — POLYETHYLENE GLYCOL 3350 17 G/17G
17 POWDER, FOR SOLUTION ORAL 2 TIMES DAILY
Qty: 1020 G | Refills: 0 | Status: SHIPPED | OUTPATIENT
Start: 2020-06-16 | End: 2020-07-16

## 2020-06-16 RX ADMIN — BUPROPION HYDROCHLORIDE 100 MG: 100 TABLET, FILM COATED, EXTENDED RELEASE ORAL at 09:06

## 2020-06-16 RX ADMIN — AZITHROMYCIN MONOHYDRATE 1000 MG: 250 TABLET ORAL at 11:06

## 2020-06-16 RX ADMIN — OXYCODONE HYDROCHLORIDE 10 MG: 5 TABLET ORAL at 03:06

## 2020-06-16 RX ADMIN — Medication: at 02:06

## 2020-06-16 RX ADMIN — DOCUSATE SODIUM 100 MG: 100 CAPSULE, LIQUID FILLED ORAL at 09:06

## 2020-06-16 RX ADMIN — GABAPENTIN 600 MG: 300 CAPSULE ORAL at 02:06

## 2020-06-16 RX ADMIN — KETOROLAC TROMETHAMINE 10 MG: 10 TABLET, FILM COATED ORAL at 05:06

## 2020-06-16 RX ADMIN — METRONIDAZOLE 500 MG: 500 TABLET, FILM COATED ORAL at 11:06

## 2020-06-16 RX ADMIN — ALPRAZOLAM 0.5 MG: 0.25 TABLET ORAL at 09:06

## 2020-06-16 RX ADMIN — CLONAZEPAM 0.5 MG: 0.5 TABLET ORAL at 09:06

## 2020-06-16 RX ADMIN — POLYETHYLENE GLYCOL 3350 17 G: 17 POWDER, FOR SOLUTION ORAL at 09:06

## 2020-06-16 RX ADMIN — ALPRAZOLAM 0.5 MG: 0.25 TABLET ORAL at 02:06

## 2020-06-16 RX ADMIN — GABAPENTIN 600 MG: 300 CAPSULE ORAL at 09:06

## 2020-06-16 RX ADMIN — OXYCODONE HYDROCHLORIDE 10 MG: 5 TABLET ORAL at 11:06

## 2020-06-16 RX ADMIN — LIDOCAINE 1 PATCH: 50 PATCH TOPICAL at 11:06

## 2020-06-16 RX ADMIN — CLINDAMYCIN IN 5 PERCENT DEXTROSE 900 MG: 18 INJECTION, SOLUTION INTRAVENOUS at 11:06

## 2020-06-16 RX ADMIN — CLINDAMYCIN IN 5 PERCENT DEXTROSE 900 MG: 18 INJECTION, SOLUTION INTRAVENOUS at 03:06

## 2020-06-16 NOTE — ASSESSMENT & PLAN NOTE
29 y.o. female with severe right lower quadrant/flank pain of unknown etiology. Now 5 Days Post-Op from negative diagnostic laparoscopy on 6/11/20. Possible etiologies include colitis/enteritis (which patient has a vague history of being diagnosed with colitis in the past), PID (though no abnormal fluid noted in the pelvis), pelvic thrombophlebitis, UTI, nephrolithiasis. Retroperitoneal ultrasound failed to show any nephrolithiasis or hydronephrosis or other etiology for pain     - etiology of pain remains unclear  - UA and urine cxs NGTD  - remains afebrile with normal white blood cell count and not elevated procalcitonin  - GYN and GI consulted  - Will transition to PO antibiotic tx today for PID:    - Zithromax 500mg 2 tablets today, then one tablet daily x 7 days    - Flagyl 500mg BID x 7 days  - Appreciate GI assistance: possible colonoscopy as inpatient vs outpatient  - Place on CLD (from Regular) in case she will undergo colonscopy as an inpatient  - Pain: de-escalate from PCA to oral oxycodone prn today. Continue multimodals, pipo Tylenol, Toradol, Flexeril 5 mg ITD, Gabapentin 600 TID, lidocaine patch.

## 2020-06-16 NOTE — ASSESSMENT & PLAN NOTE
29 y.o. female with severe right lower quadrant/flank pain of unknown etiology. Now 5 Days Post-Op from negative diagnostic laparoscopy on 6/11/20. Possible etiologies include colitis/enteritis (which patient has a vague history of being diagnosed with colitis in the past), PID (though no abnormal fluid noted in the pelvis), pelvic thrombophlebitis, UTI, nephrolithiasis. Retroperitoneal ultrasound failed to show any nephrolithiasis or hydronephrosis or other etiology for pain     - etiology of pain remains unclear  - UA and urine cxs NGTD  - remains afebrile with normal white blood cell count and not elevated procalcitonin  - GYN and GI consulted  - Will transition to PO antibiotic tx today for PID:    - Zithromax 500mg 2 tablets today, then one tablet daily x 7 days    - Flagyl 500mg BID x 7 days  - Appreciate GI assistance: possible colonoscopy as inpatient vs outpatient  - Pain: de-escalate from PCA to oral oxycodone prn today. Continue multimodals, pipo Tylenol, Toradol, Flexeril 5 mg ITD, Gabapentin 600 TID, lidocaine patch.

## 2020-06-16 NOTE — SUBJECTIVE & OBJECTIVE
"  Subjective:     Interval History: resting comfortably in bed on arrival. Discussed other services' treatment plans and evaluations thus far with request for evaluation for colonoscopy with continued symptoms. She states her abdominal discomfort is not resolved but getting better, "have some things to get done" and plans on going home soon; would not like to have a colonoscopy as inpatient but requests followup to think about and have as an outpatient.    Review of Systems   Constitutional: Negative for chills and fever.   Respiratory: Negative for shortness of breath and wheezing.    Cardiovascular: Negative for chest pain.   Gastrointestinal: Negative for blood in stool, constipation and vomiting. Abdominal pain: RLQ abdominal discomfort.     Objective:     Vital Signs (Most Recent):  Temp: 98.1 °F (36.7 °C) (06/16/20 1127)  Pulse: 73 (06/16/20 1127)  Resp: 20 (06/16/20 0745)  BP: 116/76 (06/16/20 1127)  SpO2: 99 % (06/16/20 1127) Vital Signs (24h Range):  Temp:  [97.7 °F (36.5 °C)-98.4 °F (36.9 °C)] 98.1 °F (36.7 °C)  Pulse:  [73-83] 73  Resp:  [16-20] 20  SpO2:  [93 %-99 %] 99 %  BP: (104-116)/(55-76) 116/76     Weight: 78.3 kg (172 lb 9.9 oz) (06/15/20 0600)  Body mass index is 28.73 kg/m².      Intake/Output Summary (Last 24 hours) at 6/16/2020 1518  Last data filed at 6/16/2020 1110  Gross per 24 hour   Intake 300 ml   Output 1250 ml   Net -950 ml       Lines/Drains/Airways     Peripheral Intravenous Line                 Peripheral IV - Single Lumen 06/14/20 20 G Left;Posterior Hand 2 days                Physical Exam  Constitutional:       General: She is not in acute distress.     Appearance: She is well-developed.   HENT:      Head: Normocephalic and atraumatic.      Mouth/Throat:      Pharynx: No oropharyngeal exudate.   Eyes:      General: No scleral icterus.     Extraocular Movements: EOM normal.      Pupils: Pupils are equal, round, and reactive to light.   Neck:      Musculoskeletal: Neck supple. "   Cardiovascular:      Rate and Rhythm: Regular rhythm.      Heart sounds: Normal heart sounds. No friction rub.   Pulmonary:      Effort: Pulmonary effort is normal.      Breath sounds: No wheezing.   Abdominal:      General: There is no distension.      Palpations: Abdomen is soft.      Tenderness: There is no guarding or rebound.      Comments: Bowel sounds present. Healing port incision sites. Mild tenderness to deeper palpation on right.   Musculoskeletal: Normal range of motion.   Skin:     General: Skin is warm and dry.      Findings: No rash.   Neurological:      Mental Status: She is oriented to person, place, and time.      Coordination: Coordination normal.   Psychiatric:         Mood and Affect: Mood and affect normal.         Behavior: Behavior normal.

## 2020-06-16 NOTE — ASSESSMENT & PLAN NOTE
"Requested for colonoscopy by Surgical service; offered to patient but states she is doing a bit better, "have some things to do" and declined, would like to follow up as an outpatient to potentially have outpatient after thinking more about this.  Can arrange GI followup appointment accordingly.   "

## 2020-06-16 NOTE — PLAN OF CARE
Patient remains inpatient. Not medically ready for d/c at this time. Per MD progress note, to advance diet and transition to PO pain meds.         06/16/20 1226   Discharge Reassessment   Assessment Type Discharge Planning Reassessment   Discharge Plan A Home   Discharge Plan B Home with family   DME Needed Upon Discharge  none   Anticipated Discharge Disposition Home

## 2020-06-16 NOTE — DISCHARGE SUMMARY
[facility]  General Surgery  Discharge Summary      Patient Name: Cary King  MRN: [id number]  Admission Date: 6/9/2020  Hospital Length of Stay: 5 days  Discharge Date and Time:  06/16/2020 3:51 PM  Attending Physician: Khari Cardenas Jr.*   Discharging Provider: Mallory Hector MD  Primary Care Provider: Primary Doctor No     HPI: Cary King is a 29 y.o. female who presented to [facility] Emergency room with gradually worsening right lower quadrant pain which became severe over 4 days.  Pain was located in the right lower quadrant but also radiated to the right flank and back.  Pain started approximately 2 days after the beginning of her menses.  In the emergency room she was found have grossly normal labs including a normal white count, normal LFTs and normal UA with a negative pregnancy test.  Pelvic ultrasound was obtained which failed to show any ovarian torsion or other abnormality.  A CT scan was then obtained which failed to clearly identify the appendix but no obvious right lower quadrant inflammatory process was present.  Surgery was consulted for evaluation.  Patient had received multiple doses narcotic pain medication without relief.  Due to severity of pain and uncertain etiology she was accepted as a transfer and put in observation.      Patient reports continued right lower quadrant pain which was minimally relieved with oxycodone.  The pain is pretty constant.  Denies nausea or vomiting or fever or chills.  Has had normal bowel function without any obvious abnormalities and without any relief in her pain.  Still passing flatus.  No issues with urination.     I have reviewed the patient's chart including prior progress notes, procedures and testing. The patient has a previous history of tubal ligation and umbilical hernia repair with mesh.  Was treated for UTI and possible STD in May.       Procedure(s) (LRB):  LAPAROSCOPY, DIAGNOSTIC (N/A)  LYSIS, ADHESIONS,  LAPAROSCOPIC (N/A)  REMOVAL, MESH (N/A)     Hospital Course: Patient was admitted on 6/9/20 for right lower quadrant abdominal pain. She was taken for diagnostic laparoscopy on 6/11/20. No acute intraabdominal findings were found: the appendix, gallbladder, fallopian tubes, and the pelvic cul-de-sac were negative for any pathology. A prior abdominal wall mesh was partially resected because it was balled up. See operative report for full details. Postoperatively, etiologies of her abdominal pain were to include colitis/enteritis (which patient has a vague history of being diagnosed with colitis in the past), PID (though no abnormal fluid noted in the pelvis), pelvic thrombophlebitis, UTI, nephrolithiasis. Retroperitoneal ultrasound failed to show any nephrolithiasis or hydronephrosis or other etiology for pain. Gynecology was consulted, and the patient was prescribed a course of antibiotics to treat PID. Ectopic pregnancy was ruled out with the negative diagnostic lap and normal beta-HCG x2. GI was consulted and offered to perform a colonoscopy, and the patient elected to proceed with the scope as an outpatient. The patients has been afebrile, tolerating a regular diet, ambulating, normal vitals and labs, and states that her pain is adequately controlled on PO pain meds at this time. She is deemed fit for discharge home. Follow up in clinic in two weeks and perform outpatient colonoscopy.    Consults:   Consults (From admission, onward)        Status Ordering Provider     [facility]     Provider:  Jared Pandya MD    Completed SHANIA HESS SJillian CASTANEDA     Inpatient consult to Gynecology  Once     Provider:  Shirin Calhoun MD    [facility]      Pharmacy to dose Aminoglycosides consult  Once     Provider:  (Not yet assigned)    Acknowledged SHIRIN CALHOUN          Significant Diagnostic Studies: Labs:   BMP:   Recent Labs   Lab 06/15/20  0787  06/16/20  0647    95    138   K 4.6 4.4    104   CO2 31* 30*   BUN 10 14   CREATININE 0.8 0.7   CALCIUM 9.2 8.6*    and CBC   Recent Labs   Lab 06/15/20  0722 06/16/20  0647   WBC 3.64* 4.54   HGB 11.3* 10.3*   HCT 35.4* 32.4*    240     Microbiology:   Urine Culture    Lab Results   Component Value Date    LABURIN No growth 06/12/2020       Pending Diagnostic Studies:     None        Final Active Diagnoses:    Diagnosis Date Noted POA    PRINCIPAL PROBLEM:  RLQ abdominal pain [R10.31] 06/09/2020 Yes    Depression [F32.9] 06/10/2020 Yes    Right lower quadrant abdominal pain [R10.31] 06/09/2020 Yes      Problems Resolved During this Admission:      Discharged Condition: good    Disposition: Home or Self Care    Follow Up:  Follow-up Information     Khari Cardenas Jr, MD On 6/30/2020.    Specialty: General Surgery  Why: For your follow up appointment at 1:40pm.  Contact information:  [address] [address]  [phone]             Ximena Jones MD.    Specialty: Hospitalist  Why: For your hospital follow up appointment and to establish PCP at 1:30pm [address]  Contact information:  [address] [address]  [phone]                 Patient Instructions:      No driving until:   Order Comments: While taking narcotic pain medications.     Notify your health care provider if you experience any of the following:  temperature >100.4     Notify your health care provider if you experience any of the following:  persistent nausea and vomiting or diarrhea     Notify your health care provider if you experience any of the following:  severe uncontrolled pain     Notify your health care provider if you experience any of the following:  redness, tenderness, or signs of infection (pain, swelling, redness, odor or green/yellow discharge around incision site)     No dressing needed     Activity as tolerated   Order Comments: - Ok to shower. No submersion of surgical  wounds (bath, pool, hot tub, etc) for two weeks.  - No heavy lifting greater than 10 pounds.     Medications:  Reconciled Home Medications:      Medication List      START taking these medications    azithromycin 500 MG tablet  Commonly known as: ZITHROMAX  Take 1 tablet (500 mg total) by mouth once daily. for 6 days  Start taking on: June 17, 2020     cyclobenzaprine 5 MG tablet  Commonly known as: FLEXERIL  Take 1 tablet (5 mg total) by mouth 3 (three) times daily. for 10 days     docusate sodium 100 MG capsule  Commonly known as: COLACE  Take 1 capsule (100 mg total) by mouth 2 (two) times daily.     metroNIDAZOLE 500 MG tablet  Commonly known as: FLAGYL  Take 1 tablet (500 mg total) by mouth every 12 (twelve) hours. for 6 days     oxyCODONE 5 MG immediate release tablet  Commonly known as: ROXICODONE  Take 1 tablet (5 mg total) by mouth every 4 (four) hours as needed.     polyethylene glycol 17 gram/dose powder  Commonly known as: GLYCOLAX  Take 17 g by mouth 2 (two) times daily.        CHANGE how you take these medications    * gabapentin 300 MG capsule  Commonly known as: NEURONTIN  Take 300 mg by mouth 3 (three) times daily.  What changed: Another medication with the same name was added. Make sure you understand how and when to take each.     * gabapentin 300 MG capsule  Commonly known as: NEURONTIN  Take 2 capsules (600 mg total) by mouth 3 (three) times daily.  What changed: You were already taking a medication with the same name, and this prescription was added. Make sure you understand how and when to take each.         * This list has 2 medication(s) that are the same as other medications prescribed for you. Read the directions carefully, and ask your doctor or other care provider to review them with you.            CONTINUE taking these medications    acetaminophen 650 MG Tbsr  Commonly known as: TYLENOL  Take 1 tablet (650 mg total) by mouth every 8 (eight) hours.     albuterol 90 mcg/actuation  inhaler  Commonly known as: PROVENTIL/VENTOLIN HFA  Inhale 1-2 puffs into the lungs every 6 (six) hours as needed for Wheezing.     KlonoPIN 0.5 MG tablet  Generic drug: clonazePAM  Take 1 tablet by mouth 2 (two) times a day.     WELLBUTRIN  MG TBSR 12 hr tablet  Generic drug: buPROPion  Take 1 tablet by mouth 2 (two) times a day.            Mallory Hector MD  General Surgery  [facility]

## 2020-06-16 NOTE — PROGRESS NOTES
[facility]  General Surgery  Progress Note    Subjective:     History of Present Illness:  Cary King is a 29 y.o. female who presented with severe right lower quadrant abdominal and flank pain.  Labs and UA unremarkable.  CT scan and pelvic ultrasound unremarkable. Due to persistent severe pain the patient underwent diagnostic laparoscopy.  No obvious etiology of her pain was found.  Appendix appeared normal as well as the ileum.  There was some mild inflammation and numerous interloop adhesions of the mid small bowel.  She had significant omental adhesions to mesh from a previous umbilical hernia repair, the mesh was found to be folded and not adhered to the anterior abdominal wall there for the majority the mesh was removed after lysis of adhesions was performed.  The patient's ovaries appeared grossly normal and she is status post tubal ligation.  There is no obvious abscess or torsion.  The uterus was somewhat enlarged and possibly mildly inflamed but there is no purulence or turbid fluid in the pelvic cul-de-sac.  The gallbladder appeared grossly normal.       Post-Op Info:  Procedure(s) (LRB):  LAPAROSCOPY, DIAGNOSTIC (N/A)  LYSIS, ADHESIONS, LAPAROSCOPIC (N/A)  REMOVAL, MESH (N/A)   5 Days Post-Op     Interval History: No acute events overnight.  Spaced out PCA interval yesterday, well-tolerated. Had BM overnight, reportedly normal. Persistent RLQ/R flank pain, however patient clinically appears very comfortable/drowsy. Remains AF with VSS.     Medications:  Continuous Infusions:  Scheduled Meds:   acetaminophen  1,000 mg Oral Q8H    ALPRAZolam  0.5 mg Oral TID    buPROPion  100 mg Oral BID    clindamycin (CLEOCIN) IVPB  900 mg Intravenous Q8H    clonazePAM  0.5 mg Oral BID    cyclobenzaprine  5 mg Oral TID    docusate sodium  100 mg Oral BID    gabapentin  600 mg Oral TID    gentamicin  5 mg/kg (Ideal) Intravenous Q24H    ketorolac  10 mg Oral Q8H    lidocaine  1 patch  Transdermal Daily    polyethylene glycol  17 g Oral BID     PRN Meds:melatonin, naloxone, ondansetron, ondansetron, oxyCODONE, oxyCODONE, sodium chloride 0.9%     Review of patient's allergies indicates:   Allergen Reactions    Naproxen Hives    Tessalon [benzonatate] Swelling    Ciprofloxacin Rash     Objective:     Vital Signs (Most Recent):  Temp: 98.2 °F (36.8 °C) (06/16/20 0745)  Pulse: 81 (06/16/20 0745)  Resp: 20 (06/16/20 0745)  BP: 107/66 (06/16/20 0745)  SpO2: 96 % (06/16/20 0745) Vital Signs (24h Range):  Temp:  [97.6 °F (36.4 °C)-98.4 °F (36.9 °C)] 98.2 °F (36.8 °C)  Pulse:  [76-83] 81  Resp:  [16-20] 20  SpO2:  [93 %-97 %] 96 %  BP: (100-110)/(55-66) 107/66     Weight: 78.3 kg (172 lb 9.9 oz)  Body mass index is 28.73 kg/m².    Intake/Output - Last 3 Shifts       06/14 0700 - 06/15 0659 06/15 0700 - 06/16 0659 06/16 0700 - 06/17 0659    P.O. 325      IV Piggyback 100 250 50    Total Intake(mL/kg) 425 (5.4) 250 (3.2) 50 (0.6)    Urine (mL/kg/hr) 2050 (1.1) 1550 (0.8)     Stool  0     Total Output 2050 1550     Net -1625 -1300 +50           Urine Occurrence   2 x    Stool Occurrence  1 x 1 x          Physical Exam  Constitutional:       Appearance: She is well-developed.   Neck:      Vascular: No JVD.      Trachea: No tracheal deviation.   Cardiovascular:      Rate and Rhythm: Normal rate and regular rhythm.   Pulmonary:      Effort: No respiratory distress.      Breath sounds: Normal breath sounds.   Abdominal:      General: There is no distension.      Palpations: Abdomen is soft. There is no mass.      Tenderness: There is no abdominal tenderness. There is no guarding or rebound.      Comments: Lidocaine patch to right flank   Musculoskeletal:         General: No edema.   Skin:     General: Skin is warm and dry.   Neurological:      Mental Status: She is alert and oriented to person, place, and time.         Significant Labs:  CBC:   Recent Labs   Lab 06/16/20  0647   WBC 4.54   RBC 3.38*   HGB  10.3*   HCT 32.4*      MCV 96   MCH 30.5   MCHC 31.8*     CMP:   Recent Labs   Lab 06/13/20  0436  06/16/20  0647   GLU 92   < > 95   CALCIUM 8.5*   < > 8.6*   ALBUMIN 2.8*  --   --    PROT 5.5*  --   --       < > 138   K 4.4   < > 4.4   CO2 27   < > 30*      < > 104   BUN 7   < > 14   CREATININE 0.8   < > 0.7   ALKPHOS 71  --   --    ALT 58*  --   --    AST 59*  --   --    BILITOT 0.2  --   --     < > = values in this interval not displayed.       Significant Diagnostics:  I have reviewed all pertinent imaging results/findings within the past 24 hours.    Assessment/Plan:     * RLQ abdominal pain  29 y.o. female with severe right lower quadrant/flank pain of unknown etiology. Now 5 Days Post-Op from negative diagnostic laparoscopy on 6/11/20. Possible etiologies include colitis/enteritis (which patient has a vague history of being diagnosed with colitis in the past), PID (though no abnormal fluid noted in the pelvis), pelvic thrombophlebitis, UTI, nephrolithiasis. Retroperitoneal ultrasound failed to show any nephrolithiasis or hydronephrosis or other etiology for pain     - etiology of pain remains unclear  - UA and urine cxs NGTD  - remains afebrile with normal white blood cell count and not elevated procalcitonin  - GYN and GI consulted  - Will transition to PO antibiotic tx today for PID:    - Zithromax 500mg 2 tablets today, then one tablet daily x 7 days    - Flagyl 500mg BID x 7 days  - Appreciate GI assistance: possible colonoscopy as inpatient vs outpatient  - Place on CLD (from Regular) in case she will undergo colonscopy as an inpatient  - Pain: de-escalate from PCA to oral oxycodone prn today. Continue multimodals, pipo Tylenol, Toradol, Flexeril 5 mg ITD, Gabapentin 600 TID, lidocaine patch.         Mallory Hector MD  General Surgery  [facility]

## 2020-06-16 NOTE — PLAN OF CARE
VN rounds: VN cued into pt's room with pt's permission. Pt resting in bed. Fall risk protocol discussed with pt. VN instructed pt to call for assistance. Pt aware and agreeable. Pt c/o abd pain. Pt reinstructed on pca use. Verbalized understanding. NAD noted. Allowed time for questions.  Will cont to be available and intervene as needed.

## 2020-06-16 NOTE — SUBJECTIVE & OBJECTIVE
Interval History: No acute events overnight.  Spaced out PCA interval yesterday, well-tolerated. Had BM overnight, reportedly normal. Persistent RLQ/R flank pain, however patient clinically appears very comfortable/drowsy. Remains AF with VSS.     Medications:  Continuous Infusions:  Scheduled Meds:   acetaminophen  1,000 mg Oral Q8H    ALPRAZolam  0.5 mg Oral TID    buPROPion  100 mg Oral BID    clindamycin (CLEOCIN) IVPB  900 mg Intravenous Q8H    clonazePAM  0.5 mg Oral BID    cyclobenzaprine  5 mg Oral TID    docusate sodium  100 mg Oral BID    gabapentin  600 mg Oral TID    gentamicin  5 mg/kg (Ideal) Intravenous Q24H    ketorolac  10 mg Oral Q8H    lidocaine  1 patch Transdermal Daily    polyethylene glycol  17 g Oral BID     PRN Meds:melatonin, naloxone, ondansetron, ondansetron, oxyCODONE, oxyCODONE, sodium chloride 0.9%     Review of patient's allergies indicates:   Allergen Reactions    Naproxen Hives    Tessalon [benzonatate] Swelling    Ciprofloxacin Rash     Objective:     Vital Signs (Most Recent):  Temp: 98.2 °F (36.8 °C) (06/16/20 0745)  Pulse: 81 (06/16/20 0745)  Resp: 20 (06/16/20 0745)  BP: 107/66 (06/16/20 0745)  SpO2: 96 % (06/16/20 0745) Vital Signs (24h Range):  Temp:  [97.6 °F (36.4 °C)-98.4 °F (36.9 °C)] 98.2 °F (36.8 °C)  Pulse:  [76-83] 81  Resp:  [16-20] 20  SpO2:  [93 %-97 %] 96 %  BP: (100-110)/(55-66) 107/66     Weight: 78.3 kg (172 lb 9.9 oz)  Body mass index is 28.73 kg/m².    Intake/Output - Last 3 Shifts       06/14 0700 - 06/15 0659 06/15 0700 - 06/16 0659 06/16 0700 - 06/17 0659    P.O. 325      IV Piggyback 100 250 50    Total Intake(mL/kg) 425 (5.4) 250 (3.2) 50 (0.6)    Urine (mL/kg/hr) 2050 (1.1) 1550 (0.8)     Stool  0     Total Output 2050 1550     Net -1625 -1300 +50           Urine Occurrence   2 x    Stool Occurrence  1 x 1 x          Physical Exam  Constitutional:       Appearance: She is well-developed.   Neck:      Vascular: No JVD.      Trachea: No  tracheal deviation.   Cardiovascular:      Rate and Rhythm: Normal rate and regular rhythm.   Pulmonary:      Effort: No respiratory distress.      Breath sounds: Normal breath sounds.   Abdominal:      General: There is no distension.      Palpations: Abdomen is soft. There is no mass.      Tenderness: There is no abdominal tenderness. There is no guarding or rebound.      Comments: Lidocaine patch to right flank   Musculoskeletal:         General: No edema.   Skin:     General: Skin is warm and dry.   Neurological:      Mental Status: She is alert and oriented to person, place, and time.         Significant Labs:  CBC:   Recent Labs   Lab 06/16/20  0647   WBC 4.54   RBC 3.38*   HGB 10.3*   HCT 32.4*      MCV 96   MCH 30.5   MCHC 31.8*     CMP:   Recent Labs   Lab 06/13/20  0436  06/16/20  0647   GLU 92   < > 95   CALCIUM 8.5*   < > 8.6*   ALBUMIN 2.8*  --   --    PROT 5.5*  --   --       < > 138   K 4.4   < > 4.4   CO2 27   < > 30*      < > 104   BUN 7   < > 14   CREATININE 0.8   < > 0.7   ALKPHOS 71  --   --    ALT 58*  --   --    AST 59*  --   --    BILITOT 0.2  --   --     < > = values in this interval not displayed.       Significant Diagnostics:  I have reviewed all pertinent imaging results/findings within the past 24 hours.

## 2020-06-16 NOTE — PLAN OF CARE
Scheduled medications administered per MD order. PCA pump in use for pain control. Denies any nausea. Afebrile. Safety maintained.

## 2020-06-16 NOTE — PROGRESS NOTES
"[facility]  Gastroenterology  Progress Note    Patient Name: Cary King  MRN: [id number]  Admission Date: 6/9/2020  Hospital Length of Stay: 5 days  Code Status: Full Code   Attending Provider: Khari Cardenas Jr.*  Consulting Provider: Andrey Pool MD  Primary Care Physician: Primary Doctor No  Principal Problem: RLQ abdominal pain      Subjective:     Interval History: resting comfortably in bed on arrival. Discussed other services' treatment plans and evaluations thus far with request for evaluation for colonoscopy with continued symptoms. She states her abdominal discomfort is not resolved but getting better, "have some things to get done" and plans on going home soon; would not like to have a colonoscopy as inpatient but requests followup to think about and have as an outpatient.    Review of Systems   Constitutional: Negative for chills and fever.   Respiratory: Negative for shortness of breath and wheezing.    Cardiovascular: Negative for chest pain.   Gastrointestinal: Negative for blood in stool, constipation and vomiting. Abdominal pain: RLQ abdominal discomfort.     Objective:     Vital Signs (Most Recent):  Temp: 98.1 °F (36.7 °C) (06/16/20 1127)  Pulse: 73 (06/16/20 1127)  Resp: 20 (06/16/20 0745)  BP: 116/76 (06/16/20 1127)  SpO2: 99 % (06/16/20 1127) Vital Signs (24h Range):  Temp:  [97.7 °F (36.5 °C)-98.4 °F (36.9 °C)] 98.1 °F (36.7 °C)  Pulse:  [73-83] 73  Resp:  [16-20] 20  SpO2:  [93 %-99 %] 99 %  BP: (104-116)/(55-76) 116/76     Weight: 78.3 kg (172 lb 9.9 oz) (06/15/20 0600)  Body mass index is 28.73 kg/m².      Intake/Output Summary (Last 24 hours) at 6/16/2020 1518  Last data filed at 6/16/2020 1110  Gross per 24 hour   Intake 300 ml   Output 1250 ml   Net -950 ml       Lines/Drains/Airways     Peripheral Intravenous Line                 Peripheral IV - Single Lumen 06/14/20 20 G Left;Posterior Hand 2 days                Physical Exam  Constitutional:       General: " "She is not in acute distress.     Appearance: She is well-developed.   HENT:      Head: Normocephalic and atraumatic.      Mouth/Throat:      Pharynx: No oropharyngeal exudate.   Eyes:      General: No scleral icterus.     Extraocular Movements: EOM normal.      Pupils: Pupils are equal, round, and reactive to light.   Neck:      Musculoskeletal: Neck supple.   Cardiovascular:      Rate and Rhythm: Regular rhythm.      Heart sounds: Normal heart sounds. No friction rub.   Pulmonary:      Effort: Pulmonary effort is normal.      Breath sounds: No wheezing.   Abdominal:      General: There is no distension.      Palpations: Abdomen is soft.      Tenderness: There is no guarding or rebound.      Comments: Bowel sounds present. Healing port incision sites. Mild tenderness to deeper palpation on right.   Musculoskeletal: Normal range of motion.   Skin:     General: Skin is warm and dry.      Findings: No rash.   Neurological:      Mental Status: She is oriented to person, place, and time.      Coordination: Coordination normal.   Psychiatric:         Mood and Affect: Mood and affect normal.         Behavior: Behavior normal.         Assessment/Plan:     * RLQ abdominal pain  Requested for colonoscopy by Surgical service; offered to patient but states she is doing a bit better, "have some things to do" and declined, would like to follow up as an outpatient to potentially have outpatient after thinking more about this.  Can arrange GI followup appointment accordingly.       Patient case discussed with attending physician Dr. Laguna with assessment and plan as above and any addenda/clarifications to follow in attestation as needed.  Andrey Pool MD  GI Fellow  "

## 2020-06-16 NOTE — PROGRESS NOTES
Therapy with gentamicin complete and/or consult discontinued by provider.  Pharmacy will sign off, please re-consult as needed.

## 2020-06-17 ENCOUNTER — TELEPHONE (OUTPATIENT)
Dept: SURGERY | Facility: CLINIC | Age: 30
End: 2020-06-17

## 2020-06-17 ENCOUNTER — PATIENT OUTREACH (OUTPATIENT)
Dept: ADMINISTRATIVE | Facility: CLINIC | Age: 30
End: 2020-06-17

## 2020-06-17 NOTE — PROGRESS NOTES
C3 nurse attempted to contact patient. No answer. The following message was left for the patient to return the call:  Good afternoon,  I am a nurse calling on behalf of [facility] from the [facility].  This is a Transitional Care Call for Cary King. When you have a moment please contact us at [phone] or [phone] Monday through Friday, between the hours of 8 am to 4 pm. We look forward to speaking with you. On behalf of [facility] have a nice day.    The patient has a scheduled HOSFU appointment with FLY Jones on 6/22 @ 2833. Message sent to Physician staff.

## 2020-06-17 NOTE — TELEPHONE ENCOUNTER
"----- Message from Carlie Haro sent at 6/17/2020  3:12 PM CDT -----  Contact: [phone]/Self  Patient is requesting to speak with nurse regarding medication. [facility]. Please advise.        06/17/2020       1539  Contacted patient regarding the above message. Patient stated that she is in pain. Rated pain 9/10. Asked if she was taking the pain medication. She said her pain medication is "no longer working. I look like I'm 3 months pregnant. It's the lower part of my abdomen. Patient denies redness. I can't move. Let patient know that I would forward her concerns to Dr. Cardenas and someone would contact her. Patient verbalized understanding.       1601  Contacted patient to let her know after speaking with Dr. Cardenas and explaining her concerns, Dr. Cardenas recommends that she proceed to emergency room if her pain is unbearable. Patient questioned going to the ER. "If I go they are going to keep me. Can't I just go to urgent care? I don't want to go to the emergency room." I reiterated the doctors recommendations. Patient verbalized understanding.  "

## 2020-06-18 ENCOUNTER — TELEPHONE (OUTPATIENT)
Dept: PRIMARY CARE CLINIC | Facility: CLINIC | Age: 30
End: 2020-06-18

## 2020-06-18 ENCOUNTER — HOSPITAL ENCOUNTER (EMERGENCY)
Facility: HOSPITAL | Age: 30
Discharge: HOME | End: 2020-06-18
Attending: EMERGENCY MEDICINE
Payer: MEDICAID

## 2020-06-18 VITALS
BODY MASS INDEX: 28.66 KG/M2 | HEIGHT: 65 IN | TEMPERATURE: 98 F | HEART RATE: 102 BPM | RESPIRATION RATE: 24 BRPM | WEIGHT: 172 LBS | OXYGEN SATURATION: 98 %

## 2020-06-18 DIAGNOSIS — R10.84 GENERALIZED ABDOMINAL PAIN: Primary | ICD-10-CM

## 2020-06-18 DIAGNOSIS — R10.9 ABDOMINAL PAIN: ICD-10-CM

## 2020-06-18 LAB
ALBUMIN SERPL BCP-MCNC: 4.2 G/DL (ref 3.5–5.2)
ALP SERPL-CCNC: 77 U/L (ref 55–135)
ALT SERPL W/O P-5'-P-CCNC: 77 U/L (ref 10–44)
ANION GAP SERPL CALC-SCNC: 8 MMOL/L (ref 8–16)
AST SERPL-CCNC: 55 U/L (ref 10–40)
B-HCG UR QL: NEGATIVE
BASOPHILS # BLD AUTO: 0.04 K/UL (ref 0–0.2)
BASOPHILS NFR BLD: 0.7 % (ref 0–1.9)
BILIRUB SERPL-MCNC: 0.9 MG/DL (ref 0.1–1)
BILIRUB UR QL STRIP: NEGATIVE
BUN SERPL-MCNC: 15 MG/DL (ref 6–20)
CALCIUM SERPL-MCNC: 10.2 MG/DL (ref 8.7–10.5)
CHLORIDE SERPL-SCNC: 100 MMOL/L (ref 95–110)
CLARITY UR: ABNORMAL
CO2 SERPL-SCNC: 29 MMOL/L (ref 23–29)
COLOR UR: ABNORMAL
CREAT SERPL-MCNC: 1 MG/DL (ref 0.5–1.4)
CTP QC/QA: YES
DIFFERENTIAL METHOD: ABNORMAL
EOSINOPHIL # BLD AUTO: 0.2 K/UL (ref 0–0.5)
EOSINOPHIL NFR BLD: 4.1 % (ref 0–8)
ERYTHROCYTE [DISTWIDTH] IN BLOOD BY AUTOMATED COUNT: 13.9 % (ref 11.5–14.5)
EST. GFR  (AFRICAN AMERICAN): >60 ML/MIN/1.73 M^2
EST. GFR  (NON AFRICAN AMERICAN): >60 ML/MIN/1.73 M^2
GLUCOSE SERPL-MCNC: 103 MG/DL (ref 70–110)
GLUCOSE UR QL STRIP: NEGATIVE
HCT VFR BLD AUTO: 42.9 % (ref 37–48.5)
HGB BLD-MCNC: 14.1 G/DL (ref 12–16)
HGB UR QL STRIP: ABNORMAL
IMM GRANULOCYTES # BLD AUTO: 0.01 K/UL (ref 0–0.04)
IMM GRANULOCYTES NFR BLD AUTO: 0.2 % (ref 0–0.5)
KETONES UR QL STRIP: NEGATIVE
LEUKOCYTE ESTERASE UR QL STRIP: NEGATIVE
LIPASE SERPL-CCNC: 12 U/L (ref 4–60)
LYMPHOCYTES # BLD AUTO: 1.5 K/UL (ref 1–4.8)
LYMPHOCYTES NFR BLD: 25.1 % (ref 18–48)
MCH RBC QN AUTO: 30.5 PG (ref 27–31)
MCHC RBC AUTO-ENTMCNC: 32.9 G/DL (ref 32–36)
MCV RBC AUTO: 93 FL (ref 82–98)
MICROSCOPIC COMMENT: ABNORMAL
MONOCYTES # BLD AUTO: 0.6 K/UL (ref 0.3–1)
MONOCYTES NFR BLD: 9.6 % (ref 4–15)
NEUTROPHILS # BLD AUTO: 3.5 K/UL (ref 1.8–7.7)
NEUTROPHILS NFR BLD: 60.3 % (ref 38–73)
NITRITE UR QL STRIP: NEGATIVE
NRBC BLD-RTO: 0 /100 WBC
PH UR STRIP: 6 [PH] (ref 5–8)
PLATELET # BLD AUTO: 391 K/UL (ref 150–350)
PMV BLD AUTO: 9.4 FL (ref 9.2–12.9)
POTASSIUM SERPL-SCNC: 4.5 MMOL/L (ref 3.5–5.1)
PROT SERPL-MCNC: 8.4 G/DL (ref 6–8.4)
PROT UR QL STRIP: ABNORMAL
RBC # BLD AUTO: 4.63 M/UL (ref 4–5.4)
RBC #/AREA URNS HPF: 10 /HPF (ref 0–4)
SODIUM SERPL-SCNC: 137 MMOL/L (ref 136–145)
SP GR UR STRIP: >=1.03 (ref 1–1.03)
URN SPEC COLLECT METH UR: ABNORMAL
UROBILINOGEN UR STRIP-ACNC: NEGATIVE EU/DL
WBC # BLD AUTO: 5.82 K/UL (ref 3.9–12.7)

## 2020-06-18 PROCEDURE — 99284 EMERGENCY DEPT VISIT MOD MDM: CPT | Mod: 25

## 2020-06-18 PROCEDURE — 96375 TX/PRO/DX INJ NEW DRUG ADDON: CPT

## 2020-06-18 PROCEDURE — 96374 THER/PROPH/DIAG INJ IV PUSH: CPT

## 2020-06-18 PROCEDURE — 85025 COMPLETE CBC W/AUTO DIFF WBC: CPT

## 2020-06-18 PROCEDURE — 80053 COMPREHEN METABOLIC PANEL: CPT

## 2020-06-18 PROCEDURE — 96361 HYDRATE IV INFUSION ADD-ON: CPT

## 2020-06-18 PROCEDURE — 63600175 PHARM REV CODE 636 W HCPCS: Performed by: NURSE PRACTITIONER

## 2020-06-18 PROCEDURE — 25000003 PHARM REV CODE 250: Performed by: NURSE PRACTITIONER

## 2020-06-18 PROCEDURE — 81000 URINALYSIS NONAUTO W/SCOPE: CPT

## 2020-06-18 PROCEDURE — 81025 URINE PREGNANCY TEST: CPT | Performed by: EMERGENCY MEDICINE

## 2020-06-18 PROCEDURE — 83690 ASSAY OF LIPASE: CPT

## 2020-06-18 RX ORDER — DIPHENHYDRAMINE HYDROCHLORIDE 50 MG/ML
12.5 INJECTION INTRAMUSCULAR; INTRAVENOUS
Status: COMPLETED | OUTPATIENT
Start: 2020-06-18 | End: 2020-06-18

## 2020-06-18 RX ORDER — ONDANSETRON 4 MG/1
4 TABLET, ORALLY DISINTEGRATING ORAL EVERY 8 HOURS PRN
Qty: 10 TABLET | Refills: 0 | Status: SHIPPED | OUTPATIENT
Start: 2020-06-18 | End: 2020-06-22

## 2020-06-18 RX ORDER — HALOPERIDOL 5 MG/ML
5 INJECTION INTRAMUSCULAR
Status: COMPLETED | OUTPATIENT
Start: 2020-06-18 | End: 2020-06-18

## 2020-06-18 RX ADMIN — HALOPERIDOL LACTATE 5 MG: 5 INJECTION, SOLUTION INTRAMUSCULAR at 07:06

## 2020-06-18 RX ADMIN — DIPHENHYDRAMINE HYDROCHLORIDE 12.5 MG: 50 INJECTION, SOLUTION INTRAMUSCULAR; INTRAVENOUS at 07:06

## 2020-06-18 RX ADMIN — SODIUM CHLORIDE 1000 ML: 0.9 INJECTION, SOLUTION INTRAVENOUS at 07:06

## 2020-06-18 NOTE — PATIENT INSTRUCTIONS
Wound Check After Surgery, No Complication  Surgery involves cutting through layers of skin, fatty tissue, muscle, and sometimes bone and cartilage. Sutures (stitches) or staples are used to close all layers of the wound. The sutures on the inside will dissolve in about 2 to 3 weeks. Any sutures or staples used on the outside need to be removed in about 7 to 14 days, depending on the location.  It is normal to have some clear or bloody discharge on the wound covering or bandage (dressing) for the first few days after surgery. If your wound was sutured (sewn) closed, you should not have to change the dressing more than twice a day in the first few days. Bleeding or discharge requiring more frequent dressing changes can be a sign of a problem.  It is normal to feel pain at the incision site. The pain decreases as the wound heals. Most of the pain and soreness from the skin incision should go away by the time the sutures or staples are removed. Soreness and pain from deeper tissues may last another week or two.  Pain that continues more than a few weeks after surgery or pain that worsens anytime after surgery can be a sign of a problem, such as:  · Infection  · Separation of wound edges  · Collection of blood or other below the skin  Home care  Different types of surgery require different types of care and dressing changes. It is important to follow all instructions and advice from your surgeon, as well as other members of your healthcare team.  Wound care  · Keep the wound clean, as directed by your healthcare provider.  · Change the dressing as directed. Change the dressing sooner if it becomes wet or stained with blood or fluid from the wound.  · Bathe with a sponge (no shower or tub baths) for the first few days after surgery, or until there is no more drainage from the wound. Unless you received different instructions from your surgeon, you can then shower. Do not soak the area in water (no baths or swimming)  until the tape, sutures, or staples are removed and any wound opening has dried out and healed.  Changing the dressing  · Wash your hands before changing the dressings.  · Carefully remove the dressing and tape; dont just yank it off. If it sticks to the wound, you may need to wet it a little to remove it, unless your healthcare provider told you not to wet it.  · Wash your hands again before putting on a new, clean dressing.  · Gently clean the wound with clean water (or saline) using gauze or a clean washcloth. Do not rub it or pick at it.  · Do not use soap, alcohol, hydrogen peroxide, or any other cleanser.  · If you were told to dry the wound before putting on a new dressing, gently pat it dry. Do not rub.  · Throw out the old dressing. Do not reuse it!  · Wash your hands again when you are done.  Types of dressings  Your healthcare team will tell you what type of dressing to put on your wound. Follow your healthcare teams instructions carefully, and contact them if you have any questions. Two common types of dressings are described below. You may have one of these or another type.  · Dry dressing. Use dry gauze. If the wound is still draining, use a nonadherent dressing, which shouldnt stick to the wound.  · Wet-to-dry dressing. Wet the gauze, and squeeze out the excess water (or saline), before putting it on. Then, cover this with a dry pad.  Medicines  · If you were given antibiotics, take them until they are used up or your healthcare provider tells you to stop. It is important to finish the antibiotics even though you feel better, to make sure the infection has cleared.  · You can take acetaminophen or ibuprofen for pain, unless you were given a different pain medicine to use. (Note: If you have chronic liver or kidney disease, or have ever had a stomach ulcer or gastrointestinal bleeding, or are taking blood thinner medicines, talk with your healthcare provider before using these  medicines.)  · Aspirin should never be used in anyone under 18 years of age who is ill with a fever. It may cause severe liver damage.  Follow-up care  Follow up with your healthcare provider, or as advised, for your next wound check or removal of your sutures, staples, or tape.  · If a culture was done, you will be notified if the results will affect your treatment. You can call as directed for the results.  · If imaging tests, such as X-rays, an ultrasound, or CT scan were done, they will be reviewed by a specialist. You will be notified of the results, especially if they affect treatment.  Call 911  Call emergency services right away if any of these occur:  · Trouble breathing or swallowing, wheezing  · Hoarse voice or trouble speaking  · Extreme confusion  · Extreme drowsiness or trouble awakening  · Fainting or loss of consciousness  · Rapid heart rate or very slow heart rate  · Vomiting blood, or large amounts of blood in stool  · Discomfort in the center of the chest that feels like pressure, squeezing, a sense of fullness, or pain.  · Discomfort or pain in other upper body areas, such as the back, one or both arms, neck, jaw, or stomach  · Stroke symptoms (spot a stroke FAST)  ¨ F: Face drooping. One side of the face is numb or droops.  ¨ A: Arm weakness. One arm feels weak or numb.  ¨ S: Speech difficulty: Speech is slurred, or the person is unable to speak.  ¨ T: Time to call 911. Even if symptoms go away, call 911.  When to seek medical advice  Call your healthcare provider right away if any of the following occur:  · Increasing pain at the site of surgery  · Fever over 100.4º F (38º C)  · Redness around the wound  · Fluid, pus, or blood draining from the wound  · Vomiting, constipation, or diarrhea  Date Last Reviewed: 9/27/2015  © 7059-3550 [employer]. [address] [place] [address], [place] [address]. All rights reserved. This information is not intended as a substitute for professional  medical care. Always follow your healthcare professional's instructions.

## 2020-06-18 NOTE — TELEPHONE ENCOUNTER
Patient called in stating that she is in a lot of pain, she has an appointment on Monday but the pain medicine is not doing anything for her. She was discharged with oxycodone 5 mg, she says her body rejects it and it does not touch her pain. She says she had surgery and she is concerned because she cannot bend or move. Patient is requesting a sooner appointment than Monday.

## 2020-06-19 NOTE — ED NOTES
Adult Physical Assessment  LOC: Cary King, 29 y.o. female verified via two identifiers.  The patient is awake, alert, oriented and speaking appropriately at this time.  APPEARANCE: Patient resting comfortably and appears to be in no acute distress at this time. Patient is clean and well groomed, patient's clothing is properly fastened.  SKIN:The skin is warm and dry, color consistent with ethnicity, patient has normal skin turgor and moist mucus membranes, skin intact, no breakdown or brusing noted.  MUSCULOSKELETAL: Patient moving all extremities well, no obvious swelling or deformities noted.  RESPIRATORY: Airway is open and patent, respirations are spontaneous, patient has a normal effort and rate, no accessory muscle use noted.  CARDIAC: Patient has a normal rate and rhythm, no periphreal edema noted in any extremity, capillary refill < 3 seconds in all extremities  ABDOMEN: Soft but tender to palpation, no abdominal distention noted. Bowel sounds present in all four quadrants.  NEUROLOGIC: Eyes open spontaneously, behavior appropriate to situation, follows commands, facial expression symmetrical, bilateral hand grasp equal and even, purposeful motor response noted, normal sensation in all extremities when touched with a finger.

## 2020-06-19 NOTE — ED TRIAGE NOTES
"Patient arrived to the ED per personal transport w CC of "pelvic pain and can't pee". Patient had lap diagnostic pelvic procedure last week and is vague re the details. Crying, anxious and writhing in the bed re c/o pain. NP here for exam.   "

## 2020-06-19 NOTE — DISCHARGE INSTRUCTIONS
Continue your previously prescribed medications as directed.     Return to the ED if your condition changes, progresses, or if you have any concerns.

## 2020-06-19 NOTE — PROVIDER PROGRESS NOTES - EMERGENCY DEPT.
Emergency Department TeleTRIAGE Encounter Note      CHIEF COMPLAINT    Chief Complaint   Patient presents with    Abdominal Pain     s/p laproscopic abdominal surgery 6/10. reports diffuse abdominal pain and increased pain around incision sites.        VITAL SIGNS   Initial Vitals [06/18/20 1840]   BP Pulse Resp Temp SpO2   -- 102 (!) 24 98.4 °F (36.9 °C) 98 %      MAP       --            ALLERGIES    Review of patient's allergies indicates:   Allergen Reactions    Naproxen Hives    Tessalon [benzonatate] Swelling    Ciprofloxacin Rash       PROVIDER TRIAGE NOTE  Patient with past medical history chronic bronchitis, depression, hepatitis-C presents to the ED for evaluation of abdominal pain.  Patient had laparoscopic surgery on 06/11/2020.  She has been unable to take her pain medications because they make her sick.  Since yesterday she has had worsening pain around her incision sites.  She also reports erythema surrounding them she has some nausea but denies vomiting or diarrhea.  She denies urinary complaints.      ORDERS  Labs Reviewed   URINALYSIS, REFLEX TO URINE CULTURE   URINALYSIS MICROSCOPIC   POCT URINE PREGNANCY       ED Orders (720h ago, onward)    Start Ordered     Status Ordering Provider    06/18/20 1846 06/18/20 1845  POCT urine pregnancy  Once  Completed    Final result RICARDO FRAZIER    06/18/20 1846 06/18/20 1845  Urinalysis, Reflex to Urine Culture Urine, Clean Catch  STAT      In process RICARDO FRAZIER    06/18/20 1845 06/18/20 1845  Urinalysis Microscopic  Once      In process RICARDO FRAZIER            Virtual Visit Note: The provider triage portion of this emergency department evaluation and documentation was performed via [employer], a HIPAA-compliant telemedicine application, in concert with a tele-presenter in the room. A face to face patient evaluation with one of my colleagues will occur once the patient is placed in an emergency department  room.      DISCLAIMER: This note was prepared with [employer] voice recognition transcription software. Garbled syntax, mangled pronouns, and other bizarre constructions may be attributed to that software system.

## 2020-06-22 ENCOUNTER — OFFICE VISIT (OUTPATIENT)
Dept: PRIMARY CARE CLINIC | Facility: CLINIC | Age: 30
End: 2020-06-22
Payer: MEDICAID

## 2020-06-22 VITALS
BODY MASS INDEX: 26.27 KG/M2 | DIASTOLIC BLOOD PRESSURE: 103 MMHG | HEART RATE: 76 BPM | OXYGEN SATURATION: 99 % | SYSTOLIC BLOOD PRESSURE: 142 MMHG | WEIGHT: 157.88 LBS

## 2020-06-22 DIAGNOSIS — K52.9 COLITIS: Primary | ICD-10-CM

## 2020-06-22 PROBLEM — N30.00 ACUTE CYSTITIS WITHOUT HEMATURIA: Status: RESOLVED | Noted: 2020-05-02 | Resolved: 2020-06-22

## 2020-06-22 PROBLEM — J06.9 VIRAL URI WITH COUGH: Status: RESOLVED | Noted: 2020-05-02 | Resolved: 2020-06-22

## 2020-06-22 PROBLEM — R10.31 RLQ ABDOMINAL PAIN: Status: RESOLVED | Noted: 2020-06-09 | Resolved: 2020-06-22

## 2020-06-22 PROBLEM — R05.9 COUGH: Status: RESOLVED | Noted: 2020-05-02 | Resolved: 2020-06-22

## 2020-06-22 PROBLEM — R06.02 SOB (SHORTNESS OF BREATH): Status: RESOLVED | Noted: 2020-05-02 | Resolved: 2020-06-22

## 2020-06-22 PROCEDURE — 99213 OFFICE O/P EST LOW 20 MIN: CPT | Mod: PBBFAC,PO | Performed by: HOSPITALIST

## 2020-06-22 PROCEDURE — 99496 TRANSJ CARE MGMT HIGH F2F 7D: CPT | Mod: S$PBB,,, | Performed by: HOSPITALIST

## 2020-06-22 PROCEDURE — 99999 PR PBB SHADOW E&M-EST. PATIENT-LVL III: ICD-10-PCS | Mod: PBBFAC,,, | Performed by: HOSPITALIST

## 2020-06-22 PROCEDURE — 99999 PR PBB SHADOW E&M-EST. PATIENT-LVL III: CPT | Mod: PBBFAC,,, | Performed by: HOSPITALIST

## 2020-06-22 PROCEDURE — 99496 TRANSJ CARE MGMT HIGH F2F 7D: CPT | Mod: PBBFAC,PO | Performed by: HOSPITALIST

## 2020-06-22 PROCEDURE — 99496 TRANSITIONAL CARE MANAGE SERVICE 7 DAY DISCHARGE: ICD-10-PCS | Mod: S$PBB,,, | Performed by: HOSPITALIST

## 2020-06-22 RX ORDER — METRONIDAZOLE 500 MG/1
500 TABLET ORAL EVERY 8 HOURS
Qty: 42 TABLET | Refills: 0 | Status: SHIPPED | OUTPATIENT
Start: 2020-06-22 | End: 2020-06-22

## 2020-06-22 RX ORDER — ONDANSETRON 4 MG/1
4 TABLET, ORALLY DISINTEGRATING ORAL EVERY 6 HOURS PRN
Qty: 30 TABLET | Refills: 0 | Status: SHIPPED | OUTPATIENT
Start: 2020-06-22 | End: 2020-06-22

## 2020-06-22 RX ORDER — CIPROFLOXACIN 500 MG/1
500 TABLET ORAL EVERY 12 HOURS
Qty: 28 TABLET | Refills: 0 | Status: SHIPPED | OUTPATIENT
Start: 2020-06-22 | End: 2020-06-22

## 2020-06-22 RX ORDER — ONDANSETRON 4 MG/1
4 TABLET, ORALLY DISINTEGRATING ORAL EVERY 6 HOURS PRN
Qty: 30 TABLET | Refills: 0 | Status: SHIPPED | OUTPATIENT
Start: 2020-06-22

## 2020-06-22 RX ORDER — CIPROFLOXACIN 500 MG/1
500 TABLET ORAL EVERY 12 HOURS
Qty: 28 TABLET | Refills: 0 | Status: SHIPPED | OUTPATIENT
Start: 2020-06-22 | End: 2020-07-06

## 2020-06-22 RX ORDER — GABAPENTIN 600 MG/1
600 TABLET ORAL 3 TIMES DAILY
Qty: 270 TABLET | Refills: 1 | Status: SHIPPED | OUTPATIENT
Start: 2020-06-22 | End: 2021-06-22

## 2020-06-22 RX ORDER — METRONIDAZOLE 500 MG/1
500 TABLET ORAL EVERY 8 HOURS
Qty: 42 TABLET | Refills: 0 | Status: SHIPPED | OUTPATIENT
Start: 2020-06-22 | End: 2020-07-01

## 2020-06-22 NOTE — PROGRESS NOTES
[facility]   New Visit Progress Note   Recent Hospital Discharge     PRESENTING HISTORY     Chief Complaint/Reason for Admission:  Follow up Hospital Discharge   PCP: Primary Doctor No  Admission Date: 6/9/2020  Hospital Length of Stay: 5 days  Discharge Date and Time:  06/16/2020 3:51 PM  History of Present Illness:  Patient was admitted on 6/9/20 for right lower quadrant abdominal pain. She was taken for diagnostic laparoscopy on 6/11/20. No acute intraabdominal findings were found: the appendix, gallbladder, fallopian tubes, and the pelvic cul-de-sac were negative for any pathology. A prior abdominal wall mesh was partially resected because it was balled up. See operative report for full details. Postoperatively, etiologies of her abdominal pain were to include colitis/enteritis (which patient has a vague history of being diagnosed with colitis in the past), PID (though no abnormal fluid noted in the pelvis), pelvic thrombophlebitis, UTI, nephrolithiasis. Retroperitoneal ultrasound failed to show any nephrolithiasis or hydronephrosis or other etiology for pain. Gynecology was consulted, and the patient was prescribed a course of antibiotics to treat PID. Ectopic pregnancy was ruled out with the negative diagnostic lap and normal beta-HCG x2. GI was consulted and offered to perform a colonoscopy, and the patient elected to proceed with the scope as an outpatient. The patients has been afebrile, tolerating a regular diet, ambulating, normal vitals and labs, and states that her pain is adequately controlled on PO pain meds at this time. She is deemed fit for discharge home. Follow up in clinic in two weeks and perform outpatient colonoscopy.      ___________________________________________________________________    Today: Patient states that her symptoms were better right after discharge, but once she finished her antibiotics, her symptoms came back and worsened. She reports 10 watery dark brown stools per  day. No fevers, but does have nausea.  SHe understands that her inpatient workup was largely normal and that her symptoms are consistent with colitis and will need a colonoscopy to better characterize.     She states she had a colonoscopy about 5 years ago that showed colitis and improved with antibiotics.       Review of Systems  General ROS: negative for chills, fever or weight loss  Psychological ROS: negative for hallucination, depression or suicidal ideation  Ophthalmic ROS: negative for blurry vision, photophobia or eye pain  ENT ROS: negative for epistaxis, sore throat or rhinorrhea  Respiratory ROS: no cough, shortness of breath, or wheezing  Cardiovascular ROS: no chest pain or dyspnea on exertion  Gastrointestinal ROS: + abdominal pain and  black/ bloody stools  Genito-Urinary ROS: no dysuria, trouble voiding, or hematuria  Musculoskeletal ROS: negative for gait disturbance or muscular weakness  Neurological ROS: no syncope or seizures; no ataxia  Dermatological ROS: negative for pruritis, rash and jaundice          PAST HISTORY:     Past Medical History:   Diagnosis Date    Chronic bronchitis     Depression     Hepatitis C        Past Surgical History:   Procedure Laterality Date    DIAGNOSTIC LAPAROSCOPY N/A 6/11/2020    Procedure: LAPAROSCOPY, DIAGNOSTIC;  Surgeon: Khari Cardenas Jr., MD;  Location: [facility] OR;  Service: General;  Laterality: N/A;    HERNIA REPAIR      LAPAROSCOPIC LYSIS OF ADHESIONS N/A 6/11/2020    Procedure: LYSIS, ADHESIONS, LAPAROSCOPIC;  Surgeon: Khari Cardenas Jr., MD;  Location: [facility] OR;  Service: General;  Laterality: N/A;    REMOVAL OF MESH N/A 6/11/2020    Procedure: REMOVAL, MESH;  Surgeon: Khari Cardenas Jr., MD;  Location: [facility] OR;  Service: General;  Laterality: N/A;    TUBAL LIGATION         History reviewed. No pertinent family history.      MEDICATIONS & ALLERGIES:     Current Outpatient Medications on File Prior to Visit   Medication Sig  Dispense Refill    docusate sodium (COLACE) 100 MG capsule Take 1 capsule (100 mg total) by mouth 2 (two) times daily. 60 capsule 0    WELLBUTRIN  mg TBSR 12 hr tablet Take 1 tablet by mouth 2 (two) times a day.      [DISCONTINUED] azithromycin (ZITHROMAX) 500 MG tablet Take 1 tablet (500 mg total) by mouth once daily. for 6 days 6 tablet 0    [DISCONTINUED] gabapentin (NEURONTIN) 300 MG capsule Take 300 mg by mouth 3 (three) times daily.      [DISCONTINUED] gabapentin (NEURONTIN) 300 MG capsule Take 2 capsules (600 mg total) by mouth 3 (three) times daily. 180 capsule 0    [DISCONTINUED] metroNIDAZOLE (FLAGYL) 500 MG tablet Take 1 tablet (500 mg total) by mouth every 12 (twelve) hours. for 6 days 12 tablet 0    [DISCONTINUED] oxyCODONE (ROXICODONE) 5 MG immediate release tablet Take 1 tablet (5 mg total) by mouth every 4 (four) hours as needed. 30 tablet 0    KLONOPIN 0.5 mg tablet Take 1 tablet by mouth 2 (two) times a day.      polyethylene glycol (GLYCOLAX) 17 gram/dose powder Take 17 g by mouth 2 (two) times daily. (Patient not taking: Reported on 6/22/2020) 1020 g 0    [DISCONTINUED] acetaminophen (TYLENOL) 650 MG TbSR Take 1 tablet (650 mg total) by mouth every 8 (eight) hours. (Patient not taking: Reported on 6/22/2020) 12 tablet 0    [DISCONTINUED] albuterol 90 mcg/actuation inhaler Inhale 1-2 puffs into the lungs every 6 (six) hours as needed for Wheezing. (Patient not taking: Reported on 6/22/2020) 1 Inhaler 0    [DISCONTINUED] cyclobenzaprine (FLEXERIL) 5 MG tablet Take 1 tablet (5 mg total) by mouth 3 (three) times daily. for 10 days (Patient not taking: Reported on 6/18/2020) 30 tablet 0    [DISCONTINUED] ondansetron (ZOFRAN-ODT) 4 MG TbDL Take 1 tablet (4 mg total) by mouth every 8 (eight) hours as needed (n/v). (Patient not taking: Reported on 6/22/2020) 10 tablet 0     No current facility-administered medications on file prior to visit.         Review of patient's allergies  indicates:   Allergen Reactions    Naproxen Hives    Tessalon [benzonatate] Swelling    Ciprofloxacin Nausea Only       OBJECTIVE:     Vital Signs:  BP (!) 142/103 (BP Location: Right arm, Patient Position: Sitting, BP Method: Small (Automatic))   Pulse 76   Wt 71.6 kg (157 lb 13.6 oz)   LMP 06/06/2020   SpO2 99%   BMI 26.27 kg/m²   Wt Readings from Last 1 Encounters:   06/22/20 1344 71.6 kg (157 lb 13.6 oz)     Body mass index is 26.27 kg/m².   99%    Physical Exam:  BP (!) 142/103 (BP Location: Right arm, Patient Position: Sitting, BP Method: Small (Automatic))   Pulse 76   Wt 71.6 kg (157 lb 13.6 oz)   LMP 06/06/2020   SpO2 99%   BMI 26.27 kg/m²   General appearance: alert, cooperative, no distress  Constitutional:Oriented to person, place, and time  + appears well-developed and well-nourished.   HEENT: Normocephalic, atraumatic, neck symmetrical, no nasal discharge   Eyes: conjunctivae/corneas clear, PERRL, EOM's intact  Lungs: clear to auscultation bilaterally, no dullness to percussion bilaterally  Heart: regular rate and rhythm without rub; no displacement of the PMI   Abdomen: soft, tender throughout, distended.   Extremities: extremities symmetric; no clubbing, cyanosis, or edema  Integument: Skin color, texture, turgor normal; no rashes; hair distrubution normal  Neurologic: Alert and oriented X 3, normal strength, normal coordination and gait  Psychiatric: no pressured speech; normal affect; no evidence of impaired cognition     Laboratory  Lab Results   Component Value Date    WBC 5.82 06/18/2020    HGB 14.1 06/18/2020    HCT 42.9 06/18/2020    MCV 93 06/18/2020     (H) 06/18/2020     BMP  Lab Results   Component Value Date     06/18/2020    K 4.5 06/18/2020     06/18/2020    CO2 29 06/18/2020    BUN 15 06/18/2020    CREATININE 1.0 06/18/2020    CALCIUM 10.2 06/18/2020    ANIONGAP 8 06/18/2020    ESTGFRAFRICA >60 06/18/2020    EGFRNONAA >60 06/18/2020     Lab Results    Component Value Date    ALT 77 (H) 06/18/2020    AST 55 (H) 06/18/2020    ALKPHOS 77 06/18/2020    BILITOT 0.9 06/18/2020     No results found for: INR, PROTIME  No results found for: HGBA1C      TRANSITION OF CARE:     [facility] On Call Contact Note: 6/18/2020    Family and/or Caretaker present at visit?  No.  Diagnostic tests reviewed/disposition: No diagnosic tests pending after this hospitalization.  Disease/illness education: colitis  Home health/community services discussion/referrals: Patient does not have home health established from hospital visit.  They do not need home health.  If needed, we will set up home health for the patient.   Establishment or re-establishment of referral orders for community resources: No other necessary community resources.   Discussion with other health care providers: No discussion with other health care providers necessary.     ASSESSMENT & PLAN:     Colitis  -     gabapentin (NEURONTIN) 600 MG tablet; Take 1 tablet (600 mg total) by mouth 3 (three) times daily.  Dispense: 270 tablet; Refill: 1  -     ciprofloxacin HCl (CIPRO) 500 MG tablet; Take 1 tablet (500 mg total) by mouth every 12 (twelve) hours. for 14 days  Dispense: 28 tablet; Refill: 0  -     metroNIDAZOLE (FLAGYL) 500 MG tablet; Take 1 tablet (500 mg total) by mouth every 8 (eight) hours. for 14 days  Dispense: 42 tablet; Refill: 0  -     ondansetron (ZOFRAN-ODT) 4 MG TbDL; Take 1 tablet (4 mg total) by mouth every 6 (six) hours as needed.  Dispense: 30 tablet; Refill: 0    Pt will start a 14 day course of Cipro and flagyl.  She will follow up with GI for a colonoscopy.            Scheduled Follow-up :  Future Appointments   Date Time Provider Department Center   6/30/2020  1:40 PM Khari Cardenas Jr., MD [facility]   7/14/2020  3:00 PM Damaris Rosas MD [facility] [place] [facility]   7/15/2020  1:00 PM Ximena Jones MD [facility]       Post Visit Medication List:     Medication List           Accurate as of June 22, 2020  2:24 PM. If you have any questions, ask your nurse or doctor.            START taking these medications    ciprofloxacin HCl 500 MG tablet  Commonly known as: CIPRO  Take 1 tablet (500 mg total) by mouth every 12 (twelve) hours. for 14 days  Started by: Ximena Jones MD     gabapentin 600 MG tablet  Commonly known as: NEURONTIN  Take 1 tablet (600 mg total) by mouth 3 (three) times daily.  Replaces: gabapentin 300 MG capsule  Started by: Ximena Jones MD        CHANGE how you take these medications    metroNIDAZOLE 500 MG tablet  Commonly known as: FLAGYL  Take 1 tablet (500 mg total) by mouth every 8 (eight) hours. for 14 days  What changed: when to take this  Changed by: Ximena Jones MD     ondansetron 4 MG Tbdl  Commonly known as: ZOFRAN-ODT  Take 1 tablet (4 mg total) by mouth every 6 (six) hours as needed.  What changed:   · when to take this  · reasons to take this  Changed by: Ximena Jones MD        CONTINUE taking these medications    docusate sodium 100 MG capsule  Commonly known as: COLACE  Take 1 capsule (100 mg total) by mouth 2 (two) times daily.     KlonoPIN 0.5 MG tablet  Generic drug: clonazePAM     polyethylene glycol 17 gram/dose powder  Commonly known as: GLYCOLAX  Take 17 g by mouth 2 (two) times daily.     WELLBUTRIN  MG TBSR 12 hr tablet  Generic drug: buPROPion        STOP taking these medications    acetaminophen 650 MG Tbsr  Commonly known as: TYLENOL  Stopped by: Ximena Jones MD     albuterol 90 mcg/actuation inhaler  Commonly known as: PROVENTIL/VENTOLIN HFA  Stopped by: Ximena Jones MD     azithromycin 500 MG tablet  Commonly known as: ZITHROMAX  Stopped by: Ximena Jones MD     cyclobenzaprine 5 MG tablet  Commonly known as: FLEXERIL  Stopped by: Ximena Joens MD     gabapentin 300 MG capsule  Commonly known as: NEURONTIN  Replaced by: gabapentin 600 MG tablet  Stopped by: Ximena Jones MD     oxyCODONE 5 MG  immediate release tablet  Commonly known as: ROXICODONE  Stopped by: Ximena Jones MD           Where to Get Your Medications      These medications were sent to [facility]/pharmacy #[id number] - [address] AT CORNER OF [address] [address]    Phone: [phone]   · ciprofloxacin HCl 500 MG tablet  · gabapentin 600 MG tablet  · metroNIDAZOLE 500 MG tablet  · ondansetron 4 MG Tbdl         Signing Physician:  Ximena Jones MD

## 2020-06-24 ENCOUNTER — NURSE TRIAGE (OUTPATIENT)
Dept: ADMINISTRATIVE | Facility: CLINIC | Age: 30
End: 2020-06-24

## 2020-06-24 NOTE — TELEPHONE ENCOUNTER
Patient scheduled to be contacted today on behalf of the Post Procedural Symptom Tracker. Patient seen in clinic two days ago, denies fever, cough, difficulty breathing. Per protocol, no additional contact required at this time.    Reason for Disposition   Caller has already spoken with the PCP and has no further questions.    Additional Information   Negative: Caller is angry or rude (e.g., hangs up, verbally abusive, yelling)   Negative: Caller hangs up    Protocols used: NO CONTACT OR DUPLICATE CONTACT CALL-A-AH

## 2020-06-25 ENCOUNTER — NURSE TRIAGE (OUTPATIENT)
Dept: ADMINISTRATIVE | Facility: CLINIC | Age: 30
End: 2020-06-25

## 2020-06-25 ENCOUNTER — TELEPHONE (OUTPATIENT)
Dept: SURGERY | Facility: CLINIC | Age: 30
End: 2020-06-25

## 2020-06-25 NOTE — TELEPHONE ENCOUNTER
Pt reports she was evaluated on the 22nd for abdominal pain. Reports she is having severe abdominal pain >1 hour. Advised, per protocol to be seen in the ED for further evaluation. Verbalizes understanding.    Reason for Disposition   [1] SEVERE pain (e.g., excruciating) AND [2] present > 1 hour    Additional Information   Negative: Shock suspected (e.g., cold/pale/clammy skin, too weak to stand, low BP, rapid pulse)   Negative: Difficult to awaken or acting confused (e.g., disoriented, slurred speech)   Negative: Passed out (i.e., lost consciousness, collapsed and was not responding)   Negative: Sounds like a life-threatening emergency to the triager    Protocols used: ABDOMINAL PAIN - FEMALE-A-AH

## 2020-06-25 NOTE — TELEPHONE ENCOUNTER
----- Message from Melanie Malagon sent at 6/25/2020  3:11 PM CDT -----  Regarding: Medication  Contact: [phone]  Patient is calling to talk to nurse in regards to see if pain medication can be called in since she is having uncomfortable pain. [id number] - [address] AT [facility] [phone] (Phone)  [phone] (Fax)          06/25/2020      1625  Attempted to contact patient regarding the above message. No answer. Could not leave message due to voicemail box being full.

## 2020-06-26 ENCOUNTER — TELEPHONE (OUTPATIENT)
Dept: SURGERY | Facility: CLINIC | Age: 30
End: 2020-06-26

## 2020-06-26 ENCOUNTER — OFFICE VISIT (OUTPATIENT)
Dept: SURGERY | Facility: CLINIC | Age: 30
End: 2020-06-26
Payer: MEDICAID

## 2020-06-26 VITALS
TEMPERATURE: 98 F | HEIGHT: 65 IN | DIASTOLIC BLOOD PRESSURE: 83 MMHG | SYSTOLIC BLOOD PRESSURE: 120 MMHG | WEIGHT: 155.13 LBS | BODY MASS INDEX: 25.85 KG/M2 | HEART RATE: 100 BPM

## 2020-06-26 DIAGNOSIS — Z98.890 S/P LAPAROSCOPY: Primary | ICD-10-CM

## 2020-06-26 PROCEDURE — 99024 POSTOP FOLLOW-UP VISIT: CPT | Mod: ,,, | Performed by: STUDENT IN AN ORGANIZED HEALTH CARE EDUCATION/TRAINING PROGRAM

## 2020-06-26 PROCEDURE — 99214 OFFICE O/P EST MOD 30 MIN: CPT | Mod: PBBFAC,PO | Performed by: STUDENT IN AN ORGANIZED HEALTH CARE EDUCATION/TRAINING PROGRAM

## 2020-06-26 PROCEDURE — 99999 PR PBB SHADOW E&M-EST. PATIENT-LVL IV: ICD-10-PCS | Mod: PBBFAC,,, | Performed by: STUDENT IN AN ORGANIZED HEALTH CARE EDUCATION/TRAINING PROGRAM

## 2020-06-26 PROCEDURE — 99024 PR POST-OP FOLLOW-UP VISIT: ICD-10-PCS | Mod: ,,, | Performed by: STUDENT IN AN ORGANIZED HEALTH CARE EDUCATION/TRAINING PROGRAM

## 2020-06-26 PROCEDURE — 99999 PR PBB SHADOW E&M-EST. PATIENT-LVL IV: CPT | Mod: PBBFAC,,, | Performed by: STUDENT IN AN ORGANIZED HEALTH CARE EDUCATION/TRAINING PROGRAM

## 2020-06-26 RX ORDER — OXYCODONE AND ACETAMINOPHEN 5; 325 MG/1; MG/1
1 TABLET ORAL EVERY 4 HOURS PRN
Qty: 10 TABLET | Refills: 0 | Status: SHIPPED | OUTPATIENT
Start: 2020-06-26

## 2020-06-26 NOTE — TELEPHONE ENCOUNTER
----- Message from Isabella Hamlin sent at 6/25/2020  5:06 PM CDT -----  Regarding: Sameday appt  Pt called to reschedule appt today pt can be reached [phone].    Pt is in a lot of pain stomach where the mesh is and lower belly.    Please assist    Thank you!          06/26/2020       859  Contacted patient regarding the above message. Patient stated that she was in pain and requested to be seen today. Informed her that Dr. Cardenas was out of the office this week, however, I could schedule her to see his partner at 1340. Confirmed appointment. Patient verbalized understanding.

## 2020-06-26 NOTE — PROGRESS NOTES
S/p dx lap, removal of pervious hernia mesh  Complains of central abdominal pain, worse after moving some clothes  Occasional nausea. Some diarrhea.  Getting cscope later this month  Incisions healing well  No drainage  No erythema  No hernia  No need for imaging  RTC prn  Will give one more refill pain meds. If continues to be an issue will refer to pain management

## 2020-06-29 ENCOUNTER — TELEPHONE (OUTPATIENT)
Dept: SURGERY | Facility: CLINIC | Age: 30
End: 2020-06-29

## 2020-06-29 DIAGNOSIS — R10.9 POSTOPERATIVE ABDOMINAL PAIN: Primary | ICD-10-CM

## 2020-06-29 DIAGNOSIS — G89.18 POSTOPERATIVE ABDOMINAL PAIN: Primary | ICD-10-CM

## 2020-06-29 NOTE — TELEPHONE ENCOUNTER
----- Message from Lorena Olivera sent at 6/29/2020  2:18 PM CDT -----  Contact: [phone]  Type:  Needs Medical Advice    Who Called: Pt  Symptoms (please be specific): pt is in severe Pain since her Procedure, pt states it burns when she moves   How long has patient had these symptoms: 4 days  Pharmacy name and phone #:  [id number] - [place], [address] AT [facility]  Would the patient rather a call back or a response via [facility]? Call Back  Best Call Back Number: [phone]  Additional Information: N/A      6/29/20  2:35pm  Spoke to patient regarding above message. Patient requested refill of pain medication. I advised patient that Dr. Alatorre will not approve refill request. I advised patient that Dr. Alatorre would like to refer her to pain management. Patient verbalized understanding and accepted referral. I advised patient that a referral coordinator will contact her for scheduling. Patient verbalized understanding.

## 2020-07-01 ENCOUNTER — OFFICE VISIT (OUTPATIENT)
Dept: URGENT CARE | Facility: CLINIC | Age: 30
End: 2020-07-01
Payer: MEDICAID

## 2020-07-01 VITALS
HEIGHT: 65 IN | BODY MASS INDEX: 25.83 KG/M2 | TEMPERATURE: 98 F | WEIGHT: 155 LBS | DIASTOLIC BLOOD PRESSURE: 75 MMHG | OXYGEN SATURATION: 97 % | HEART RATE: 81 BPM | SYSTOLIC BLOOD PRESSURE: 141 MMHG

## 2020-07-01 DIAGNOSIS — K08.89 PAIN, DENTAL: Primary | ICD-10-CM

## 2020-07-01 PROCEDURE — 99214 OFFICE O/P EST MOD 30 MIN: CPT | Mod: S$GLB,,, | Performed by: NURSE PRACTITIONER

## 2020-07-01 PROCEDURE — 99214 PR OFFICE/OUTPT VISIT, EST, LEVL IV, 30-39 MIN: ICD-10-PCS | Mod: S$GLB,,, | Performed by: NURSE PRACTITIONER

## 2020-07-01 RX ORDER — TRAMADOL HYDROCHLORIDE 50 MG/1
50 TABLET ORAL EVERY 6 HOURS
Qty: 16 TABLET | Refills: 0
Start: 2020-07-01 | End: 2020-07-05

## 2020-07-01 RX ORDER — AMOXICILLIN AND CLAVULANATE POTASSIUM 875; 125 MG/1; MG/1
1 TABLET, FILM COATED ORAL 2 TIMES DAILY
Qty: 20 TABLET | Refills: 0 | Status: SHIPPED | OUTPATIENT
Start: 2020-07-01 | End: 2020-07-11

## 2020-07-01 NOTE — PATIENT INSTRUCTIONS
Please follow up with your Primary care provider within 2-5 days if your signs and symptoms have not resolved or worsen.     If your condition worsens or fails to improve we recommend that you receive another evaluation at the emergency room immediately or contact your primary medical clinic to discuss your concerns.    You must understand that you have received an Urgent Care treatment only and that you may be released before all of your medical problems are known or treated.   You, the patient, will arrange for follow up care as instructed.     You have been given Augmentin for an infection today.  Please take all the antibiotic as prescribed on the bottle.      Augmentin is hard on the stomach and should always be taken with food.      Augmentin can cause diarrhea.  As with any antibiotics, use probiotics and/or high culture yogurt about 2 hours apart from the antibiotic and about 1 week after the antibiotic to replace the gut cristiana lost with antibiotic use.      If you are female and on BCP use additional methods to prevent pregnancy while on antibiotics and for one cycle after.       Dental Abscess     A dental abscess is an infection of the tooth socket. It often starts with a crack or cavity in the tooth. A pocket of pus forms between the tooth and the bone. The infection causes pain and swelling of the gum, cheek, or jaw. The pain is often made worse by drinking hot or cold fluids, or biting on hard foods. Pain may be felt in the facial sinus or in the ear. A severe infection can interfere with swallowing and breathing.  Causes  · Cavities  · Trauma  · Previous dental work  Symptoms  · Pain  · Swelling around the tooth or face and cheek  · Redness  · Bad breath  · Bad taste in the mouth  · Fever  You will be started on an antibiotic. However, final treatment requires drainage of the pus. This can be done by removing the tooth or performing a root canal. A root canal is done by an oral surgeon. It involves  "drilling an opening in the tooth to drain the pus. After the infection has healed, a crown is placed over the tooth.  Home care  The following guidelines will help you care for your abscess at home:  · Avoid hot and cold foods and liquids, since your tooth may be sensitive to temperature changes.  · If your tooth is chipped or cracked, or if there is a large open cavity, apply oil of cloves (available over-the-counter in drug stores) directly to the tooth to reduce pain. Some drugstores carry an over-the-counter "toothache kit." This contains oil of cloves and a paste, which can be applied over the exposed tooth to decrease sensitivity.  · Apply an ice pack (ice cubes in a plastic bag, wrapped in a towel) over the injured area for 10 to 20 minutes every 1 to 2 hours the first day for pain relief. Continue this 3 to 4 times a day until the pain and swelling goes away.  · You can take acetaminophen or ibuprofen for pain, unless you were given a different pain medicine to use. (Note: If you have chronic liver or kidney disease, have ever had a stomach ulcer or gastrointestinal bleeding, or are taking blood-thinning medicines, talk with your healthcare provider before using these medicines.)  · An antibiotic will be prescribed. Take it as directed until completed, even if you are feeling better sooner.  Follow-up care  Follow up as advised with a dentist or oral surgeon. Even though your pain may improve with the treatment given today, only a dentist or oral surgeon can provide full treatment for this problem.  · If a culture was done, you will be notified if the treatment needs to be changed. You can call in as directed for the results.  · If X-rays were taken, they will be reviewed by a specialist. You will be notified of the results, especially if they affect treatment.  Call 911  Call emergency services right away if any of these occur:  · Trouble breathing or swallowing, or wheezing  · Hoarse voice or trouble " speaking  · Confusion  · Extreme drowsiness or trouble awakening  · Fainting or loss of consciousness  · Rapid heart rate  When to seek medical advice  Call your healthcare provider right away if any of these occur:  · Your face or eyelid becomes swollen or red.  · Pain worsens or spreads to the neck.  · You develop a fever of 100.4ºF (38ºC) or higher.  · You have unusual drowsiness, a headache or stiff neck, or weakness.  · Pus drains from the gum or tooth.  · You are unable to open your mouth wide.  Date Last Reviewed: 7/30/2015  © 9042-3621 [employer]. [address] [place] [address], [place] [address]. All rights reserved. This information is not intended as a substitute for professional medical care. Always follow your healthcare professional's instructions.

## 2020-07-01 NOTE — PROGRESS NOTES
"Subjective:       Patient ID: Cary King is a 29 y.o. female.    Vitals:  height is 5' 5" (1.651 m) and weight is 70.3 kg (155 lb). Her temperature is 97.7 °F (36.5 °C). Her blood pressure is 141/75 (abnormal) and her pulse is 81. Her oxygen saturation is 97%.     Chief Complaint: Dental Pain    Dental Pain   This is a new problem. Episode onset: one week ago. The problem occurs constantly. The problem has been gradually worsening. The pain is at a severity of 10/10. The pain is severe. Associated symptoms include facial pain. Pertinent negatives include no difficulty swallowing, fever, oral bleeding, sinus pressure or thermal sensitivity. Treatments tried: ibuprofen , tylenol. The treatment provided no relief.       Constitution: Negative for chills, fatigue and fever.   HENT: Positive for dental problem. Negative for congestion, sinus pressure and sore throat.    Neck: Negative for painful lymph nodes.   Cardiovascular: Negative for chest pain and leg swelling.   Eyes: Negative for double vision and blurred vision.   Respiratory: Negative for cough and shortness of breath.    Gastrointestinal: Negative for nausea, vomiting and diarrhea.   Genitourinary: Negative for dysuria, frequency, urgency and history of kidney stones.   Musculoskeletal: Positive for pain. Negative for joint pain, joint swelling, muscle cramps and muscle ache.   Skin: Negative for color change, pale, rash and bruising.   Allergic/Immunologic: Negative for seasonal allergies.   Neurological: Negative for dizziness, history of vertigo, light-headedness, passing out and headaches.   Hematologic/Lymphatic: Negative for swollen lymph nodes.   Psychiatric/Behavioral: Negative for nervous/anxious, sleep disturbance and depression. The patient is not nervous/anxious.        Objective:      Physical Exam   Constitutional: She is oriented to person, place, and time. She appears well-developed. She is cooperative.  Non-toxic appearance. She does not " appear ill. No distress.   HENT:   Head: Normocephalic and atraumatic.   Right Ear: Hearing, tympanic membrane, external ear and ear canal normal.   Left Ear: Hearing, tympanic membrane, external ear and ear canal normal.   Nose: Nose normal. No mucosal edema, rhinorrhea or nasal deformity. No epistaxis. Right sinus exhibits no maxillary sinus tenderness and no frontal sinus tenderness. Left sinus exhibits no maxillary sinus tenderness and no frontal sinus tenderness.   Mouth/Throat: Uvula is midline, oropharynx is clear and moist and mucous membranes are normal. No trismus in the jaw. Normal dentition. No uvula swelling. No oropharyngeal exudate, posterior oropharyngeal edema or posterior oropharyngeal erythema.       Eyes: Conjunctivae and lids are normal. No scleral icterus.   Neck: Trachea normal, full passive range of motion without pain and phonation normal. Neck supple. No neck rigidity. No edema and no erythema present.   Cardiovascular: Normal rate, regular rhythm, normal heart sounds and normal pulses.   Pulmonary/Chest: Effort normal and breath sounds normal. No respiratory distress. She has no decreased breath sounds. She has no rhonchi.   Musculoskeletal: Normal range of motion.         General: No deformity.   Lymphadenopathy:     She has no cervical adenopathy.        Right cervical: No superficial cervical, no deep cervical and no posterior cervical adenopathy present.       Left cervical: No superficial cervical, no deep cervical and no posterior cervical adenopathy present.   Neurological: She is alert and oriented to person, place, and time. She exhibits normal muscle tone. Coordination normal.   Skin: Skin is warm, dry, intact, not diaphoretic and not pale.   Psychiatric: Her speech is normal and behavior is normal. Judgment and thought content normal.   Nursing note and vitals reviewed.        Assessment:       1. Pain, dental        Plan:       Strongly advised patient to be seen at a dental  clinic for follow up .  Verbalized understanding.   Pain, dental  -     amoxicillin-clavulanate 875-125mg (AUGMENTIN) 875-125 mg per tablet; Take 1 tablet by mouth 2 (two) times daily. for 10 days  Dispense: 20 tablet; Refill: 0  -     traMADoL (ULTRAM) 50 mg tablet; Take 1 tablet (50 mg total) by mouth every 6 (six) hours. for 4 days  Dispense: 16 tablet; Refill: 0      Patient Instructions   Please follow up with your Primary care provider within 2-5 days if your signs and symptoms have not resolved or worsen.     If your condition worsens or fails to improve we recommend that you receive another evaluation at the emergency room immediately or contact your primary medical clinic to discuss your concerns.    You must understand that you have received an Urgent Care treatment only and that you may be released before all of your medical problems are known or treated.   You, the patient, will arrange for follow up care as instructed.     You have been given Augmentin for an infection today.  Please take all the antibiotic as prescribed on the bottle.      Augmentin is hard on the stomach and should always be taken with food.      Augmentin can cause diarrhea.  As with any antibiotics, use probiotics and/or high culture yogurt about 2 hours apart from the antibiotic and about 1 week after the antibiotic to replace the gut cristiana lost with antibiotic use.      If you are female and on BCP use additional methods to prevent pregnancy while on antibiotics and for one cycle after.       Dental Abscess     A dental abscess is an infection of the tooth socket. It often starts with a crack or cavity in the tooth. A pocket of pus forms between the tooth and the bone. The infection causes pain and swelling of the gum, cheek, or jaw. The pain is often made worse by drinking hot or cold fluids, or biting on hard foods. Pain may be felt in the facial sinus or in the ear. A severe infection can interfere with swallowing and  "breathing.  Causes  · Cavities  · Trauma  · Previous dental work  Symptoms  · Pain  · Swelling around the tooth or face and cheek  · Redness  · Bad breath  · Bad taste in the mouth  · Fever  You will be started on an antibiotic. However, final treatment requires drainage of the pus. This can be done by removing the tooth or performing a root canal. A root canal is done by an oral surgeon. It involves drilling an opening in the tooth to drain the pus. After the infection has healed, a crown is placed over the tooth.  Home care  The following guidelines will help you care for your abscess at home:  · Avoid hot and cold foods and liquids, since your tooth may be sensitive to temperature changes.  · If your tooth is chipped or cracked, or if there is a large open cavity, apply oil of cloves (available over-the-counter in drug stores) directly to the tooth to reduce pain. Some drugstores carry an over-the-counter "toothache kit." This contains oil of cloves and a paste, which can be applied over the exposed tooth to decrease sensitivity.  · Apply an ice pack (ice cubes in a plastic bag, wrapped in a towel) over the injured area for 10 to 20 minutes every 1 to 2 hours the first day for pain relief. Continue this 3 to 4 times a day until the pain and swelling goes away.  · You can take acetaminophen or ibuprofen for pain, unless you were given a different pain medicine to use. (Note: If you have chronic liver or kidney disease, have ever had a stomach ulcer or gastrointestinal bleeding, or are taking blood-thinning medicines, talk with your healthcare provider before using these medicines.)  · An antibiotic will be prescribed. Take it as directed until completed, even if you are feeling better sooner.  Follow-up care  Follow up as advised with a dentist or oral surgeon. Even though your pain may improve with the treatment given today, only a dentist or oral surgeon can provide full treatment for this problem.  · If a " culture was done, you will be notified if the treatment needs to be changed. You can call in as directed for the results.  · If X-rays were taken, they will be reviewed by a specialist. You will be notified of the results, especially if they affect treatment.  Call 911  Call emergency services right away if any of these occur:  · Trouble breathing or swallowing, or wheezing  · Hoarse voice or trouble speaking  · Confusion  · Extreme drowsiness or trouble awakening  · Fainting or loss of consciousness  · Rapid heart rate  When to seek medical advice  Call your healthcare provider right away if any of these occur:  · Your face or eyelid becomes swollen or red.  · Pain worsens or spreads to the neck.  · You develop a fever of 100.4ºF (38ºC) or higher.  · You have unusual drowsiness, a headache or stiff neck, or weakness.  · Pus drains from the gum or tooth.  · You are unable to open your mouth wide.  Date Last Reviewed: 7/30/2015  © 4419-3545 [employer]. [address] [place] [address], [place] [address]. All rights reserved. This information is not intended as a substitute for professional medical care. Always follow your healthcare professional's instructions.

## 2020-07-30 ENCOUNTER — TELEPHONE (OUTPATIENT)
Dept: PRIMARY CARE CLINIC | Facility: CLINIC | Age: 30
End: 2020-07-30
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
## 2020-12-15 ENCOUNTER — TELEPHONE (OUTPATIENT)
Dept: PRIMARY CARE CLINIC | Facility: CLINIC | Age: 30
End: 2020-12-15

## 2020-12-15 NOTE — TELEPHONE ENCOUNTER
----- Message from Elizabeth Connor sent at 12/15/2020  3:29 PM CST -----  Regarding: Refill Request for Gabapentin 600 mg  Contact: [phone]  Requesting an RX refill or new RX.RX Refill   Is this a refill or new RX: Refill   RX name and strength: Gabapentin 600 mg tab   Is this a 30 day or 90 day RX: 90 day   Pharmacy name and phone # (copy/paste from chart):  [facility] in [place], [place]. [address] [id number]. Phone number: [phone]  Comments: Patient is out of town in [place], [place] and would like her prescription sent to the [facility] there.

## 2020-12-15 NOTE — TELEPHONE ENCOUNTER
Returned patients call and notified her to contact her primary care doctor to refill her medications. She has been discharged from Dr. Jones's care.

## (undated) DEVICE — SEE MEDLINE ITEM 157117

## (undated) DEVICE — SEE MEDLINE ITEM 157148

## (undated) DEVICE — SEE MEDLINE ITEM 157116

## (undated) DEVICE — SEE MEDLINE ITEM 156955

## (undated) DEVICE — TRAY FOLEY 16FR INFECTION CONT

## (undated) DEVICE — ADHESIVE DERMABOND ADVANCED

## (undated) DEVICE — MANIFOLD 4 PORT

## (undated) DEVICE — SUT MONOCRYL 5-0 P-3 UND 18

## (undated) DEVICE — COVER OVERHEAD SURG LT BLUE

## (undated) DEVICE — DISSECTOR EPIX LAPA 5MMX35CM

## (undated) DEVICE — DRESSING TRANS 4X4 3/4

## (undated) DEVICE — CLOSURE SKIN STERI STRIP 1/2X4

## (undated) DEVICE — SUT VICRYL 3-0 27 SH

## (undated) DEVICE — BLADE SURG CARBON STEEL SZ11

## (undated) DEVICE — DRESSING AQUACEL SACRAL 9 X 9

## (undated) DEVICE — DRESSING TEGADERM 2 3/8 X 2.75

## (undated) DEVICE — SEE MEDLINE ITEM 152622

## (undated) DEVICE — GLOVE SURGICAL LATEX SZ 7

## (undated) DEVICE — Device

## (undated) DEVICE — TROCAR ENDOPATH XCEL 5X75MM

## (undated) DEVICE — TROCAR ENDOPATH XCEL 5MM 7.5CM

## (undated) DEVICE — ELECTRODE REM PLYHSV RETURN 9

## (undated) DEVICE — NDL HYPO REG 25G X 1 1/2

## (undated) DEVICE — SPONGE DERMA 8PLY 2X2

## (undated) DEVICE — PACK BASIC

## (undated) DEVICE — SUT PROLENE 0 CT-2 BL MONO

## (undated) DEVICE — APPLICATOR CHLORAPREP ORN 26ML

## (undated) DEVICE — SUT ETHILON 3/0 18IN PS-1